# Patient Record
Sex: FEMALE | Race: WHITE | NOT HISPANIC OR LATINO | Employment: OTHER | URBAN - METROPOLITAN AREA
[De-identification: names, ages, dates, MRNs, and addresses within clinical notes are randomized per-mention and may not be internally consistent; named-entity substitution may affect disease eponyms.]

---

## 2018-01-02 ENCOUNTER — OFFICE VISIT (OUTPATIENT)
Dept: FAMILY MEDICINE | Facility: CLINIC | Age: 63
End: 2018-01-02
Payer: COMMERCIAL

## 2018-01-02 VITALS
DIASTOLIC BLOOD PRESSURE: 84 MMHG | HEART RATE: 84 BPM | WEIGHT: 133 LBS | SYSTOLIC BLOOD PRESSURE: 130 MMHG | TEMPERATURE: 98.1 F | RESPIRATION RATE: 16 BRPM | BODY MASS INDEX: 23.57 KG/M2 | OXYGEN SATURATION: 99 % | HEIGHT: 63 IN

## 2018-01-02 DIAGNOSIS — R19.7 DIARRHEA, UNSPECIFIED TYPE: Primary | ICD-10-CM

## 2018-01-02 DIAGNOSIS — R63.4 LOSS OF WEIGHT: ICD-10-CM

## 2018-01-02 PROBLEM — N81.11 MIDLINE CYSTOCELE: Status: ACTIVE | Noted: 2018-01-02

## 2018-01-02 PROBLEM — E78.2 MIXED HYPERLIPIDEMIA: Status: ACTIVE | Noted: 2018-01-02

## 2018-01-02 PROBLEM — M85.80 OSTEOPENIA: Status: ACTIVE | Noted: 2018-01-02

## 2018-01-02 LAB
ALBUMIN SERPL-MCNC: 3.9 G/DL (ref 3.4–5)
ALP SERPL-CCNC: 90 U/L (ref 40–150)
ALT SERPL W P-5'-P-CCNC: 37 U/L (ref 0–50)
ANION GAP SERPL CALCULATED.3IONS-SCNC: 8 MMOL/L (ref 3–14)
AST SERPL W P-5'-P-CCNC: 26 U/L (ref 0–45)
BASOPHILS # BLD AUTO: 0.1 10E9/L (ref 0–0.2)
BASOPHILS NFR BLD AUTO: 0.7 %
BILIRUB SERPL-MCNC: 0.5 MG/DL (ref 0.2–1.3)
BUN SERPL-MCNC: 11 MG/DL (ref 7–30)
CALCIUM SERPL-MCNC: 9.6 MG/DL (ref 8.5–10.1)
CHLORIDE SERPL-SCNC: 104 MMOL/L (ref 94–109)
CO2 SERPL-SCNC: 26 MMOL/L (ref 20–32)
CREAT SERPL-MCNC: 0.82 MG/DL (ref 0.52–1.04)
DIFFERENTIAL METHOD BLD: NORMAL
EOSINOPHIL # BLD AUTO: 0.2 10E9/L (ref 0–0.7)
EOSINOPHIL NFR BLD AUTO: 2.4 %
ERYTHROCYTE [DISTWIDTH] IN BLOOD BY AUTOMATED COUNT: 12.8 % (ref 10–15)
GFR SERPL CREATININE-BSD FRML MDRD: 70 ML/MIN/1.7M2
GLUCOSE SERPL-MCNC: 94 MG/DL (ref 70–99)
HCT VFR BLD AUTO: 39.4 % (ref 35–47)
HGB BLD-MCNC: 13.4 G/DL (ref 11.7–15.7)
LYMPHOCYTES # BLD AUTO: 2.4 10E9/L (ref 0.8–5.3)
LYMPHOCYTES NFR BLD AUTO: 28.4 %
MCH RBC QN AUTO: 30.1 PG (ref 26.5–33)
MCHC RBC AUTO-ENTMCNC: 34 G/DL (ref 31.5–36.5)
MCV RBC AUTO: 89 FL (ref 78–100)
MONOCYTES # BLD AUTO: 0.6 10E9/L (ref 0–1.3)
MONOCYTES NFR BLD AUTO: 7.2 %
NEUTROPHILS # BLD AUTO: 5.2 10E9/L (ref 1.6–8.3)
NEUTROPHILS NFR BLD AUTO: 61.3 %
PLATELET # BLD AUTO: 353 10E9/L (ref 150–450)
POTASSIUM SERPL-SCNC: 3.9 MMOL/L (ref 3.4–5.3)
PROT SERPL-MCNC: 7.9 G/DL (ref 6.8–8.8)
RBC # BLD AUTO: 4.45 10E12/L (ref 3.8–5.2)
SODIUM SERPL-SCNC: 138 MMOL/L (ref 133–144)
TSH SERPL DL<=0.005 MIU/L-ACNC: 1.41 MU/L (ref 0.4–4)
WBC # BLD AUTO: 8.4 10E9/L (ref 4–11)

## 2018-01-02 PROCEDURE — 36415 COLL VENOUS BLD VENIPUNCTURE: CPT | Performed by: NURSE PRACTITIONER

## 2018-01-02 PROCEDURE — 99203 OFFICE O/P NEW LOW 30 MIN: CPT | Performed by: NURSE PRACTITIONER

## 2018-01-02 PROCEDURE — 80050 GENERAL HEALTH PANEL: CPT | Performed by: NURSE PRACTITIONER

## 2018-01-02 RX ORDER — DIPHENOXYLATE HCL/ATROPINE 2.5-.025MG
1-2 TABLET ORAL 4 TIMES DAILY PRN
Qty: 30 TABLET | Refills: 1 | Status: SHIPPED | OUTPATIENT
Start: 2018-01-02 | End: 2018-12-12

## 2018-01-02 RX ORDER — CALCIUM CARBONATE 500(1250)
1 TABLET ORAL 2 TIMES DAILY
COMMUNITY

## 2018-01-02 ASSESSMENT — PAIN SCALES - GENERAL: PAINLEVEL: NO PAIN (0)

## 2018-01-02 NOTE — PATIENT INSTRUCTIONS
[facility]    If you have any questions regarding to your visit please contact your care team:     Team Pink:   Clinic Hours Telephone Number   Internal Medicine:  Dr. Esther Norris NP       7am-7pm  Monday - Thursday   7am-5pm  Fridays  [phone]  (Appointment scheduling available 24/7)    Questions about your visit?  Team Line  [phone]   Urgent Care - [place] and [facility] - 11am-9pm Monday-Friday Saturday-Sunday- 9am-5pm   [place] - 5pm-9pm Monday-Friday Saturday-Sunday- 9am-5pm  [phone] - [place]   [phone] - [place]       What options do I have for visits at the clinic other than the traditional office visit?  To expand how we care for you, many of our providers are utilizing electronic visits (e-visits) and telephone visits, when medically appropriate, for interactions with their patients rather than a visit in the clinic.   We also offer nurse visits for many medical concerns. Just like any other service, we will bill your insurance company for this type of visit based on time spent on the phone with your provider. Not all insurance companies cover these visits. Please check with your medical insurance if this type of visit is covered. You will be responsible for any charges that are not paid by your insurance.      E-visits via Wild Needle:  generally incur a $35.00 fee.  Telephone visits:  Time spent on the phone: *charged based on time that is spent on the phone in increments of 10 minutes. Estimated cost:   5-10 mins $30.00   11-20 mins. $59.00   21-30 mins. $85.00   Use Transinsightt (secure email communication and access to your chart) to send your primary care provider a message or make an appointment. Ask someone on your Team how to sign up for Wild Needle.    For a Price Quote for your services, please call our Consumer Price Line at [phone].    As always, Thank you for trusting us with your health care  needs!    Day RAMIREZ MA

## 2018-01-02 NOTE — PROGRESS NOTES
SUBJECTIVE:   Meg Beverly is a 62 year old female who presents to clinic today for the following health issues:      Diarrhea  Onset: 7-8 weeks    Description:   Consistency of stool: watery  Blood in stool: no   Number of loose stools in past 24 hours: 10    Progression of Symptoms:  constant    Accompanying Signs & Symptoms:  Fever: no   Nausea or vomiting; YES- nausea  Abdominal pain: YES- cramping  Episodes of constipation: no   Weight loss: YES- 4-5 lbs    History:   Ill contacts: no   Recent use of antibiotics: no    Recent travels: no          Recent medication-new or changes(Rx or OTC): no     Precipitating factors:   None    Alleviating factors:   Immodium    Therapies Tried and outcome:  Imodium AD; Outcome: some improvement.    Patient's brother has Crohn's disease.  Patient had colonoscopy last in 2005.        Problem list and histories reviewed & adjusted, as indicated.  Additional history: as documented    Patient Active Problem List   Diagnosis     Osteopenia     Mixed hyperlipidemia     Midline cystocele     Past Surgical History:   Procedure Laterality Date     GYN SURGERY      D & C     ORTHOPEDIC SURGERY Left 09/2014    meniscus repair       Social History   Substance Use Topics     Smoking status: Never Smoker     Smokeless tobacco: Never Used     Alcohol use Yes      Comment: Social     Family History   Problem Relation Age of Onset     OSTEOPOROSIS Mother      Rheumatoid Arthritis Mother      CEREBROVASCULAR DISEASE Father      Hypertension Maternal Grandmother      Emphysema Maternal Grandfather      Coronary Artery Disease Paternal Grandmother      Alzheimer Disease Brother      Crohn Disease Brother          Current Outpatient Prescriptions   Medication Sig Dispense Refill     Multiple Vitamin (MULTI VITAMIN PO)        calcium carbonate (OS-LAURI 500 MG [place]. [place]) 1250 MG tablet Take 1 tablet by mouth 2 times daily       diphenoxylate-atropine (LOMOTIL) 2.5-0.025 MG per tablet Take 1-2  "tablets by mouth 4 times daily as needed for diarrhea 30 tablet 1     Allergies   Allergen Reactions     Latex Rash     BP Readings from Last 3 Encounters:   01/02/18 130/84    Wt Readings from Last 3 Encounters:   01/02/18 133 lb (60.3 kg)                  Labs reviewed in EPIC        Reviewed and updated as needed this visit by clinical staff     Reviewed and updated as needed this visit by Provider         ROS:  Constitutional, HEENT, cardiovascular, pulmonary, gi and gu systems are negative, except as otherwise noted.      OBJECTIVE:   /84  Pulse 84  Temp 98.1  F (36.7  C) (Oral)  Resp 16  Ht 5' 2.75\" (1.594 m)  Wt 133 lb (60.3 kg)  SpO2 99%  BMI 23.75 kg/m2  Body mass index is 23.75 kg/(m^2).  GENERAL: healthy, alert and no distress  RESP: lungs clear to auscultation - no rales, rhonchi or wheezes  CV: regular rate and rhythm, normal S1 S2, no S3 or S4, no murmur, click or rub, no peripheral edema and peripheral pulses strong  ABDOMEN: soft, nontender, no hepatosplenomegaly, no masses and bowel sounds normal  MS: no gross musculoskeletal defects noted, no edema    Diagnostic Test Results:  pending    ASSESSMENT/PLAN:     1. Diarrhea, unspecified type  Patient is due for colonoscopy.  Will place referral for screening vs diagnostic pending results.  - CBC with platelets differential  - Comprehensive metabolic panel  - TSH with free T4 reflex  - Clostridium difficile toxin B PCR; Future  - Enteric Bacteria and Virus Panel by MILIND Stool; Future  - Ova and Parasite Exam Routine; Future  - diphenoxylate-atropine (LOMOTIL) 2.5-0.025 MG per tablet; Take 1-2 tablets by mouth 4 times daily as needed for diarrhea  Dispense: 30 tablet; Refill: 1    2. Loss of weight    - CBC with platelets differential  - Comprehensive metabolic panel  - TSH with free T4 reflex    FUTURE APPOINTMENTS:       - Follow-up visit pending test results.    JACQUELIN Muhammad [facility]3]"

## 2018-01-02 NOTE — LETTER
[address] [place] [address]    January 3, 2018    [place]eg [place]  [address] [place] [address]          Dear Meg,    Normal thyroid.   Normal kidney function and electrolytes.   No anemia.       If you have any questions please feel free to contact [phone] or myself via BeMyEyehart    Enclosed is a copy of your results.     Results for orders placed or performed in visit on 01/02/18   CBC with platelets differential   Result Value Ref Range    WBC 8.4 4.0 - 11.0 10e9/L    RBC Count 4.45 3.8 - 5.2 10e12/L    Hemoglobin 13.4 11.7 - 15.7 g/dL    Hematocrit 39.4 35.0 - 47.0 %    MCV 89 78 - 100 fl    MCH 30.1 26.5 - 33.0 pg    MCHC 34.0 31.5 - 36.5 g/dL    RDW 12.8 10.0 - 15.0 %    Platelet Count 353 150 - 450 10e9/L    Diff Method Automated Method     % Neutrophils 61.3 %    % Lymphocytes 28.4 %    % Monocytes 7.2 %    % Eosinophils 2.4 %    % Basophils 0.7 %    Absolute Neutrophil 5.2 1.6 - 8.3 10e9/L    Absolute Lymphocytes 2.4 0.8 - 5.3 10e9/L    Absolute Monocytes 0.6 0.0 - 1.3 10e9/L    Absolute Eosinophils 0.2 0.0 - 0.7 10e9/L    Absolute Basophils 0.1 0.0 - 0.2 10e9/L   Comprehensive metabolic panel   Result Value Ref Range    Sodium 138 133 - 144 mmol/L    Potassium 3.9 3.4 - 5.3 mmol/L    Chloride 104 94 - 109 mmol/L    Carbon Dioxide 26 20 - 32 mmol/L    Anion Gap 8 3 - 14 mmol/L    Glucose 94 70 - 99 mg/dL    Urea Nitrogen 11 7 - 30 mg/dL    Creatinine 0.82 0.52 - 1.04 mg/dL    GFR Estimate 70 >60 mL/min/1.7m2    GFR Estimate If Black 85 >60 mL/min/1.7m2    Calcium 9.6 8.5 - 10.1 mg/dL    Bilirubin Total 0.5 0.2 - 1.3 mg/dL    Albumin 3.9 3.4 - 5.0 g/dL    Protein Total 7.9 6.8 - 8.8 g/dL    Alkaline Phosphatase 90 40 - 150 U/L    ALT 37 0 - 50 U/L    AST 26 0 - 45 U/L   TSH with free T4 reflex   Result Value Ref Range    TSH 1.41 0.40 - 4.00 mU/L       If you have any questions or concerns,  please call myself or my nurse at [phone].      Sincerely,        Dara Norris CNP/acosta

## 2018-01-02 NOTE — PROGRESS NOTES
Dear Meg,    Your recent test results are attached.      Normal thyroid.  Normal kidney function and electrolytes.  No anemia.      If you have any questions please feel free to contact [phone] or myself via [email or web address].    Sincerely,  Dara Norris, CNP

## 2018-01-02 NOTE — Clinical Note
Please abstract the following data from this visit with this patient into the appropriate field in Epic:  Colonoscopy done on this date: 2005 (approximately), by this group: [facility], results were normal.  Mammogram done on this date: 2016 (approximately), by this group: [facility], results were normal.  Pap smear done on this date: 2015 (approximately), by this group: [facility], results were normal .

## 2018-01-08 DIAGNOSIS — R19.7 DIARRHEA, UNSPECIFIED TYPE: ICD-10-CM

## 2018-01-08 PROCEDURE — 87209 SMEAR COMPLEX STAIN: CPT | Performed by: NURSE PRACTITIONER

## 2018-01-08 PROCEDURE — 87177 OVA AND PARASITES SMEARS: CPT | Performed by: NURSE PRACTITIONER

## 2018-01-08 PROCEDURE — 87506 IADNA-DNA/RNA PROBE TQ 6-11: CPT | Performed by: NURSE PRACTITIONER

## 2018-01-09 LAB
O+P STL MICRO: NORMAL
O+P STL MICRO: NORMAL
SPECIMEN SOURCE: NORMAL

## 2018-01-10 DIAGNOSIS — R19.7 DIARRHEA: Primary | ICD-10-CM

## 2018-01-10 DIAGNOSIS — R63.4 WEIGHT LOSS: ICD-10-CM

## 2018-01-11 ENCOUNTER — TELEPHONE (OUTPATIENT)
Dept: INTERNAL MEDICINE | Facility: CLINIC | Age: 63
End: 2018-01-11

## 2018-01-11 NOTE — TELEPHONE ENCOUNTER
Reason for Call:  Other call back    Detailed comments: Pt is scheduled for a colonoscopy 1-17-18. Pt would like to know if she can continue to take her diphenoxylate-atropine. Please call pt to advise.    Phone Number Patient can be reached at: Home number on file [phone] (home)    Best Time: any    Can we leave a detailed message on this number? YES    Call taken on 1/11/2018 at 11:51 AM by Ebonie Jules

## 2018-01-12 NOTE — TELEPHONE ENCOUNTER
Spoke with patient.   Provider note read as written.  No further questions at this time.     Chen Lawson RN

## 2018-01-17 ENCOUNTER — SURGERY (OUTPATIENT)
Age: 63
End: 2018-01-17

## 2018-01-17 ENCOUNTER — HOSPITAL ENCOUNTER (OUTPATIENT)
Facility: AMBULATORY SURGERY CENTER | Age: 63
Discharge: HOME | End: 2018-01-17
Attending: SURGERY | Admitting: SURGERY
Payer: COMMERCIAL

## 2018-01-17 VITALS
SYSTOLIC BLOOD PRESSURE: 126 MMHG | TEMPERATURE: 97.6 F | DIASTOLIC BLOOD PRESSURE: 82 MMHG | RESPIRATION RATE: 16 BRPM | OXYGEN SATURATION: 98 %

## 2018-01-17 LAB — COLONOSCOPY: NORMAL

## 2018-01-17 PROCEDURE — 45385 COLONOSCOPY W/LESION REMOVAL: CPT | Mod: PT | Performed by: SURGERY

## 2018-01-17 PROCEDURE — G8907 PT DOC NO EVENTS ON DISCHARG: HCPCS

## 2018-01-17 PROCEDURE — 45381 COLONOSCOPY SUBMUCOUS NJX: CPT

## 2018-01-17 PROCEDURE — 45381 COLONOSCOPY SUBMUCOUS NJX: CPT | Mod: PT | Performed by: SURGERY

## 2018-01-17 PROCEDURE — G8918 PT W/O PREOP ORDER IV AB PRO: HCPCS

## 2018-01-17 PROCEDURE — 45380 COLONOSCOPY AND BIOPSY: CPT | Mod: XS

## 2018-01-17 PROCEDURE — 88305 TISSUE EXAM BY PATHOLOGIST: CPT | Performed by: PATHOLOGY

## 2018-01-17 PROCEDURE — 45385 COLONOSCOPY W/LESION REMOVAL: CPT

## 2018-01-17 PROCEDURE — 99152 MOD SED SAME PHYS/QHP 5/>YRS: CPT | Mod: 59 | Performed by: SURGERY

## 2018-01-17 PROCEDURE — 45380 COLONOSCOPY AND BIOPSY: CPT | Mod: 59 | Performed by: SURGERY

## 2018-01-17 RX ORDER — LIDOCAINE 40 MG/G
CREAM TOPICAL
Status: DISCONTINUED | OUTPATIENT
Start: 2018-01-17 | End: 2018-01-18 | Stop reason: HOSPADM

## 2018-01-17 RX ORDER — ONDANSETRON 2 MG/ML
4 INJECTION INTRAMUSCULAR; INTRAVENOUS
Status: DISCONTINUED | OUTPATIENT
Start: 2018-01-17 | End: 2018-01-18 | Stop reason: HOSPADM

## 2018-01-17 RX ORDER — FENTANYL CITRATE 50 UG/ML
INJECTION, SOLUTION INTRAMUSCULAR; INTRAVENOUS PRN
Status: DISCONTINUED | OUTPATIENT
Start: 2018-01-17 | End: 2018-01-17 | Stop reason: HOSPADM

## 2018-01-17 RX ADMIN — FENTANYL CITRATE 50 MCG: 50 INJECTION, SOLUTION INTRAMUSCULAR; INTRAVENOUS at 11:20

## 2018-01-17 RX ADMIN — FENTANYL CITRATE 100 MCG: 50 INJECTION, SOLUTION INTRAMUSCULAR; INTRAVENOUS at 11:07

## 2018-01-17 RX ADMIN — FENTANYL CITRATE 50 MCG: 50 INJECTION, SOLUTION INTRAMUSCULAR; INTRAVENOUS at 11:22

## 2018-01-19 LAB — COPATH REPORT: NORMAL

## 2018-01-21 ENCOUNTER — HEALTH MAINTENANCE LETTER (OUTPATIENT)
Age: 63
End: 2018-01-21

## 2018-01-24 ENCOUNTER — TELEPHONE (OUTPATIENT)
Dept: FAMILY MEDICINE | Facility: CLINIC | Age: 63
End: 2018-01-24

## 2018-01-24 NOTE — TELEPHONE ENCOUNTER
Patient called [facility] requesting results from Colonoscopy. Patient read on her forms that if she wasn't given a call from Provider office regarding results in 1 week to call and request for results. Patient informed writer that it is okay to leave detailed message in case if she doesn't answer. Please advise. Rita Rees MA

## 2018-02-01 DIAGNOSIS — K58.9 IRRITABLE BOWEL SYNDROME: ICD-10-CM

## 2018-02-01 DIAGNOSIS — R19.7 DIARRHEA, UNSPECIFIED TYPE: ICD-10-CM

## 2018-02-01 DIAGNOSIS — K57.90 DIVERTICULOSIS: Primary | ICD-10-CM

## 2018-02-01 LAB
TTG IGA SER-ACNC: <1 U/ML
TTG IGG SER-ACNC: <1 U/ML

## 2018-02-01 PROCEDURE — 99000 SPECIMEN HANDLING OFFICE-LAB: CPT | Performed by: NURSE PRACTITIONER

## 2018-02-01 PROCEDURE — 83516 IMMUNOASSAY NONANTIBODY: CPT | Performed by: NURSE PRACTITIONER

## 2018-02-01 PROCEDURE — 83516 IMMUNOASSAY NONANTIBODY: CPT | Mod: 59 | Performed by: NURSE PRACTITIONER

## 2018-02-01 PROCEDURE — 86256 FLUORESCENT ANTIBODY TITER: CPT | Mod: 90 | Performed by: NURSE PRACTITIONER

## 2018-02-01 PROCEDURE — 36415 COLL VENOUS BLD VENIPUNCTURE: CPT | Performed by: NURSE PRACTITIONER

## 2018-02-02 LAB — ENDOMYSIUM IGA TITR SER IF: NORMAL {TITER}

## 2018-02-02 NOTE — PROGRESS NOTES
Dear Meg,    Your recent test results are attached.      Negative celiac test.  Further test results are still pending.    If you have any questions please feel free to contact [phone] or myself via Camping and Cot.    Sincerely,  Dara Norris, CNP

## 2018-03-21 ENCOUNTER — OFFICE VISIT (OUTPATIENT)
Dept: NUTRITION | Facility: CLINIC | Age: 63
End: 2018-03-21
Payer: COMMERCIAL

## 2018-03-21 VITALS — WEIGHT: 135 LBS | BODY MASS INDEX: 24.11 KG/M2

## 2018-03-21 DIAGNOSIS — K58.0 IRRITABLE BOWEL SYNDROME WITH DIARRHEA: Primary | ICD-10-CM

## 2018-03-21 PROCEDURE — 97802 MEDICAL NUTRITION INDIV IN: CPT

## 2018-03-21 NOTE — Clinical Note
We were able to identify many foods that may be contributing to the diarrhea and have a starting point.  Hopefully this will resolve the issue!  Tara Hong MS, RD, LD, CDE

## 2018-03-21 NOTE — PROGRESS NOTES
MEDICAL NUTRITION THERAPY  Visit Type:Initial assessment and intervention    SUBJECTIVE:   Meg Beverly presents today for MNT and education related to Diarrhea.     She is accompanied by self.     PATIENT STATED GOAL(S) FOR THIS VISIT: Would like to determine what might be causing diarrhea.  This past week had usual diarrhea, and reports that it is random and explosive diarrhea. Yesterday was last episode.  Typically it is 2-3 times per week.  Has noticed that she is sensitive to some fruits and vegetables.      EATING HABITS: Fresh fruits and vegetables know to cause diarrhea (Apples and apple sauce)    Has humus in fridge, a few times per week           Beverages: Alcohol 0-1/day, Tea  0-1/day, Coffee 1-2/day and Water throughout/day- Patient Tea includes Chi and Herbal teas    Misses meals? no    Previous diet education:  No     Food allergies/intolerances: Denies Lactose intolerance    EXERCISE: not assessed    SOCIO/ECONOMIC:   Lives with: self and spouse    OBJECTIVE:   Vitals: Wt 61.2 kg (135 lb)  BMI 24.11 kg/m2     Wt Readings from Last 1 Encounters:   01/02/18 60.3 kg (133 lb)       Weight Change: stable for past 2 months in the presence of intermittent diarrhea    ASSESSMENT:   Patient would benefit from trying Low-FODMAP diet pattern as diet history and food recall include many FODMAP foods.  Patient may have a total daily tolerance of all FODMAP foods or some specific FODMAP foods may cause diarrhea.  One tea patient reported ingredients contain Chicory know to have FODMAP and diarrhea causing properties.  After further discussion of diarrhea bout in November patient reports trying to switch from coffee to tea at that time and was adding honey to her cup which contains Fructose.  Patient reports she felt she should wean herself off the honey due to sugar content, and notes it's possible that contributed to decreased episodes of diarrhea.  FODMAP foods that patient frequently eats were identified:  Chi tea, many vegetables, onion, wheat breads and crackers.  Non FODMAP foods that may contribute to diarrhea include coffee and alcohol.  Patients yogurt and kefir do not contain FODMAP foods.    Diet is high in: fiber, fruits and vegetables  Diet is low in: fat (saturated), fat (unsaturated) and fruits    Estimated Energy Expenditure: 1222 kcal  Recommended Energy Intake: 1222 kcal for weight maintinencce    VERBAL AND WRITTEN INFORMATION GIVEN TO SUPPORT:  Discussed: Used FODMAP food lists and handouts to discuss FODMAP foods allowed and Not allowed foods.  Explained that the initial recommendation is to stick to the Low-FODMAP diet plan with none of the not-allowed foods to create a foundation of usual food consumption that is thought to limit diarrhea.  If after 2-6 weeks episodes of diarrhea are eliminated try reintroducing 1-2 foods per week or 1 new food each day depending on tolerance and track symptoms.  Discussed specifically non-wheat alternative grains and how to cook them as patients diet recall contained may wheat products.  Reviewed how to read a nutrition label to identify common FODMAP foods.    Education Materials Provided: Low-FODMAP Nutrition Therapy, Low-FODMAP food list, food symptom diary sheet    PLAN:   Follow FODMAP elimination diet plan-as outlined in handout  Initial phase- Low-FODMAP only foods for 2-6 weeks which eliminates all High-FODMAP foods from daily intake  Test phase- Reintroduce 1-2 new foods per week OR 1 new food each day depending on symptoms  Keep a symptom and food diary.    PATIENT'S BEHAVIOR CHANGE GOALS:   See Patient Instructions for patient stated behavior change goals. AVS was printed and given to patient at today's appointment.    FOLLOW UP:   Follow up with RD as needed.  Recommended 6-8 weeks after initial trial phase.    Tara Hong MS, RD, LD, CDE    Time spent in minutes: 75  Encounter: Individual

## 2018-03-21 NOTE — MR AVS SNAPSHOT
After Visit Summary   3/21/2018    Meg Beverly    MRN: [id number]           Patient Information     Date Of Birth          1955        Visit Information        Provider Department      3/21/2018 3:30 PM  NUTRITION RESOURCE [facility]        Care Instructions    Lao coconut chi-  Has Chicory which triggers diarrhea    Polenta- easy corn based side, can be sliced and fried or mixed up into a risotto/grits consistency    [email or web address]    [email or web address]    Follow up in 6-8 weeks if trying Low-FODMAP foods/diet pattern has not resolved the diarrhea    [facility]:   Nutrition Appointments Call:  [phone]    Nutrition Related Questions:   Phone: [phone]  E-mail: [email or web address]  Fax: [phone]                   Follow-ups after your visit        Who to contact     If you have questions or need follow up information about today's clinic visit or your schedule please contact [facility] directly at [phone].  Normal or non-critical lab and imaging results will be communicated to you by ScoopStakehart, letter or phone within 4 business days after the clinic has received the results. If you do not hear from us within 7 days, please contact the clinic through ScoopStakehart or phone. If you have a critical or abnormal lab result, we will notify you by phone as soon as possible.  Submit refill requests through [employer] or call your pharmacy and they will forward the refill request to us. Please allow 3 business days for your refill to be completed.          Additional Information About Your Visit        MyChart Information     [employer] gives you secure access to your electronic health record. If you see a primary care provider, you can also send messages to your care team and make appointments. If you have questions, please call your primary care clinic.  If you do not have a primary care  provider, please call [phone] and they will assist you.        Care EveryWhere ID     This is your Care EveryWhere ID. This could be used by other organizations to access your [facility] medical records  [id number]        Your Vitals Were     BMI (Body Mass Index)                   24.11 kg/m2            Blood Pressure from Last 3 Encounters:   01/17/18 126/82   01/02/18 130/84    Weight from Last 3 Encounters:   03/21/18 61.2 kg (135 lb)   01/02/18 60.3 kg (133 lb)              Today, you had the following     No orders found for display       Primary Care Provider Office Phone # Fax #    [facility] [phone] [address] [address]        Equal Access to Services     KIARA BADILLO : Hadii aad ku hadasho Soomaali, waaxda luqadaha, qaybta kaalmada adeegyada, alexandro mccloud . So [facility] [phone].    ATENCIÓN: Si habla español, tiene a barajas disposición servicios gratuitos de asistencia lingüística. Llame al [phone].    We comply with applicable federal civil rights laws and [place] laws. We do not discriminate on the basis of race, color, national origin, age, disability, sex, sexual orientation, or gender identity.            Thank you!     Thank you for choosing [facility]  for your care. Our goal is always to provide you with excellent care. Hearing back from our patients is one way we can continue to improve our services. Please take a few minutes to complete the written survey that you may receive in the mail after your visit with us. Thank you!             Your Updated Medication List - Protect others around you: Learn how to safely use, store and throw away your medicines at www.disposemymeds.org.          This list is accurate as of 3/21/18  4:26 PM.  Always use your most recent med list.                   Brand Name Dispense Instructions for use Diagnosis    calcium carbonate 1250 MG tablet    OS-LAURI 500 mg  [place]. [place]     Take 1 tablet by mouth 2 times daily        diphenoxylate-atropine 2.5-0.025 MG per tablet    LOMOTIL    30 tablet    Take 1-2 tablets by mouth 4 times daily as needed for diarrhea    Diarrhea, unspecified type       MULTI VITAMIN PO

## 2018-06-06 ENCOUNTER — OFFICE VISIT (OUTPATIENT)
Dept: FAMILY MEDICINE | Facility: CLINIC | Age: 63
End: 2018-06-06
Payer: COMMERCIAL

## 2018-06-06 VITALS
DIASTOLIC BLOOD PRESSURE: 64 MMHG | SYSTOLIC BLOOD PRESSURE: 128 MMHG | TEMPERATURE: 97.5 F | OXYGEN SATURATION: 98 % | HEIGHT: 63 IN | BODY MASS INDEX: 22.39 KG/M2 | HEART RATE: 87 BPM | RESPIRATION RATE: 14 BRPM | WEIGHT: 126.4 LBS

## 2018-06-06 DIAGNOSIS — L98.9 SKIN LESION: Primary | ICD-10-CM

## 2018-06-06 DIAGNOSIS — K59.1 FUNCTIONAL DIARRHEA: ICD-10-CM

## 2018-06-06 PROBLEM — K58.0 IRRITABLE BOWEL SYNDROME WITH DIARRHEA: Status: ACTIVE | Noted: 2018-06-06

## 2018-06-06 PROBLEM — K58.0 IRRITABLE BOWEL SYNDROME WITH DIARRHEA: Status: RESOLVED | Noted: 2018-06-06 | Resolved: 2018-06-06

## 2018-06-06 PROCEDURE — 99213 OFFICE O/P EST LOW 20 MIN: CPT | Performed by: NURSE PRACTITIONER

## 2018-06-06 ASSESSMENT — PAIN SCALES - GENERAL: PAINLEVEL: NO PAIN (0)

## 2018-06-06 NOTE — PATIENT INSTRUCTIONS
[facility]    If you have any questions regarding to your visit please contact your care team:     Team Pink:   Clinic Hours Telephone Number   Internal Medicine:  Dr. Esther Norris NP       7am-7pm  Monday - Thursday   7am-5pm  Fridays  [phone]  (Appointment scheduling available 24/7)    Questions about your recent visit?  Team Line  [phone]   Urgent Care - [place] - 11am-9pm Monday-Friday Saturday-Sunday- 9am-5pm   [place] - 5pm-9pm Monday-Friday Saturday-Sunday- 9am-5pm  [phone] - [place]  [phone] - [place]       What options do I have for a visit other than an office visit? We offer electronic visits (e-visits) and telephone visits, when medically appropriate.  Please check with your medical insurance to see if these types of visits are covered, as you will be responsible for any charges that are not paid by your insurance.      You can use [employer] (secure electronic communication) to access to your chart, send your primary care provider a message, or make an appointment. Ask a team member how to get started.     For a price quote for your services, please call our Consumer Price Line at [phone] or our Imaging Cost estimation line at [phone] (for imaging tests).  Sirisha Pan CMA

## 2018-06-06 NOTE — MR AVS SNAPSHOT
After Visit Summary   6/6/2018    Meg Beverly    MRN: [id number]           Patient Information     Date Of Birth          1955        Visit Information        Provider Department      6/6/2018 3:20 PM Dara Norris APRN [facility]        Today's Diagnoses     Skin lesion    -  1    Functional diarrhea          Care Instructions    [facility]    If you have any questions regarding to your visit please contact your care team:     Team Pink:   Clinic Hours Telephone Number   Internal Medicine:  Dr. Esther Norris, NP       7am-7pm  Monday - Thursday   7am-5pm  Fridays  [phone]  (Appointment scheduling available 24/7)    Questions about your recent visit?  Team Line  [phone]   Urgent Care - [place] and [facility] - 11am-9pm Monday-Friday Saturday-Sunday- 9am-5pm   [place] - 5pm-9pm Monday-Friday Saturday-Sunday- 9am-5pm  [phone] - [place]  [phone] - [place]       What options do I have for a visit other than an office visit? We offer electronic visits (e-visits) and telephone visits, when medically appropriate.  Please check with your medical insurance to see if these types of visits are covered, as you will be responsible for any charges that are not paid by your insurance.      You can use [employer] (secure electronic communication) to access to your chart, send your primary care provider a message, or make an appointment. Ask a team member how to get started.     For a price quote for your services, please call our Consumer Price Line at [phone] or our Imaging Cost estimation line at [phone] (for imaging tests).  Sirisha Pan CMA             Follow-ups after your visit        Additional Services     DERMATOLOGY REFERRAL       Your provider has referred you to: [facility]: [facility] [phone]    [email or web address]  [facility] [place] (2 locations) [phone]   [email or web address]    Please be aware that coverage of these services is subject to the terms and limitations of your health insurance plan.  Call member services at your health plan with any benefit or coverage questions.      Please bring the following with you to your appointment:    (1) Any X-Rays, CTs or MRIs which have been performed.  Contact the facility where they were done to arrange for  prior to your scheduled appointment.    (2) List of current medications  (3) This referral request   (4) Any documents/labs given to you for this referral                  Who to contact     If you have questions or need follow up information about today's clinic visit or your schedule please contact [facility] directly at [phone].  Normal or non-critical lab and imaging results will be communicated to you by MyChart, letter or phone within 4 business days after the clinic has received the results. If you do not hear from us within 7 days, please contact the clinic through People[employer] or phone. If you have a critical or abnormal lab result, we will notify you by phone as soon as possible.  Submit refill requests through [employer] or call your pharmacy and they will forward the refill request to us. Please allow 3 business days for your refill to be completed.          Additional Information About Your Visit        [employer] Information     [employer] gives you secure access to your electronic health record. If you see a primary care provider, you can also send messages to your care team and make appointments. If you have questions, please call your primary care clinic.  If you do not have a primary care provider, please call [phone] and they will assist you.        Care EveryWhere ID     This is your Care EveryWhere ID. This could be used by other  "organizations to access your [facility] medical records  [id number]        Your Vitals Were     Pulse Temperature Respirations Height Pulse Oximetry BMI (Body Mass Index)    87 97.5  F (36.4  C) (Oral) 14 5' 3.31\" (1.608 m) 98% 22.17 kg/m2       Blood Pressure from Last 3 Encounters:   06/06/18 128/64   01/17/18 126/82   01/02/18 130/84    Weight from Last 3 Encounters:   06/06/18 126 lb 6.4 oz (57.3 kg)   03/21/18 135 lb (61.2 kg)   01/02/18 133 lb (60.3 kg)              We Performed the Following     DERMATOLOGY REFERRAL        Primary Care Provider Office Phone # Fax #    [facility] [phone] [address] [facility] [address]        Equal Access to Services     [facility]IZ [facility] : Hadii celestine seay hadasho Somaricruz, waaxda luqadaha, qaybta kaalmada adeegyada, alexandro perezin hayjohn mccloud . So [facility] [phone].    ATENCIÓN: Si habla español, tiene a barajas disposición servicios gratuitos de asistencia lingüística. Jessica al [phone].    We comply with applicable federal civil rights laws and [place] laws. We do not discriminate on the basis of race, color, national origin, age, disability, sex, sexual orientation, or gender identity.            Thank you!     Thank you for choosing [facility]  for your care. Our goal is always to provide you with excellent care. Hearing back from our patients is one way we can continue to improve our services. Please take a few minutes to complete the written survey that you may receive in the mail after your visit with us. Thank you!             Your Updated Medication List - Protect others around you: Learn how to safely use, store and throw away your medicines at www.disposemymeds.org.          This list is accurate as of 6/6/18  4:01 PM.  Always use your most recent med list.                   Brand Name Dispense Instructions for use Diagnosis    calcium carbonate 500 MG tablet    OS-LAURI 500 mg [place]. [place]     Take 1 " tablet by mouth 2 times daily        diphenoxylate-atropine 2.5-0.025 MG per tablet    LOMOTIL    30 tablet    Take 1-2 tablets by mouth 4 times daily as needed for diarrhea    Diarrhea, unspecified type       MULTI VITAMIN PO

## 2018-06-06 NOTE — PROGRESS NOTES
SUBJECTIVE:   Meg Beverly is a 62 year old female who presents to clinic today for the following health issues:      Chief Complaint   Patient presents with     Derm Problem     red spot on rt ankle, getting bigger and raised, itchy sometimes- noticed 5 months ago     Patient notes a skin lesion to her right shin.  It has grown and she is concerned it may be skin cancer.  She denies bleeding or drainage.    Patient notes that her functional diarrhea has fully resolved with following the FODMAP diet.  She has lost weight, but feel the diet has been completely beneficial.      Problem list and histories reviewed & adjusted, as indicated.  Additional history: as documented    Patient Active Problem List   Diagnosis     Osteopenia     Mixed hyperlipidemia     Midline cystocele     Functional diarrhea     Past Surgical History:   Procedure Laterality Date     COLONOSCOPY N/A 1/17/2018    Procedure: COMBINED COLONOSCOPY, SINGLE OR MULTIPLE BIOPSY/POLYPECTOMY BY BIOPSY;;  Surgeon: Curly Laura DO;  Location: [facility] OR     COLONOSCOPY WITH CO2 INSUFFLATION N/A 1/17/2018    Procedure: COLONOSCOPY WITH CO2 INSUFFLATION;  COLON-DIARRHEA,WEIGHT LOSS/ VANDER WAL;  Surgeon: Curly Laura DO;  Location: [facility] OR     GYN SURGERY      D & C     ORTHOPEDIC SURGERY Left 09/2014    meniscus repair       Social History   Substance Use Topics     Smoking status: Never Smoker     Smokeless tobacco: Never Used     Alcohol use Yes      Comment: Social     Family History   Problem Relation Age of Onset     OSTEOPOROSIS Mother      Rheumatoid Arthritis Mother      CEREBROVASCULAR DISEASE Father      Hypertension Maternal Grandmother      Emphysema Maternal Grandfather      Coronary Artery Disease Paternal Grandmother      Alzheimer Disease Brother      Crohn Disease Brother          Current Outpatient Prescriptions   Medication Sig Dispense Refill     calcium carbonate (OS-LAURI 500 MG [place]. [place]) 1250 MG tablet Take 1 tablet by mouth 2  "times daily       Multiple Vitamin (MULTI VITAMIN PO)        diphenoxylate-atropine (LOMOTIL) 2.5-0.025 MG per tablet Take 1-2 tablets by mouth 4 times daily as needed for diarrhea (Patient not taking: Reported on 6/6/2018) 30 tablet 1     Allergies   Allergen Reactions     Latex Rash     BP Readings from Last 3 Encounters:   06/06/18 128/64   01/17/18 126/82   01/02/18 130/84    Wt Readings from Last 3 Encounters:   06/06/18 126 lb 6.4 oz (57.3 kg)   03/21/18 135 lb (61.2 kg)   01/02/18 133 lb (60.3 kg)                  Labs reviewed in EPIC    Reviewed and updated as needed this visit by clinical staff       Reviewed and updated as needed this visit by Provider         ROS:  Constitutional, HEENT, cardiovascular, pulmonary, gi and gu systems are negative, except as otherwise noted.    OBJECTIVE:     /64  Pulse 87  Temp 97.5  F (36.4  C) (Oral)  Resp 14  Ht 5' 3.31\" (1.608 m)  Wt 126 lb 6.4 oz (57.3 kg)  SpO2 98%  BMI 22.17 kg/m2  Body mass index is 22.17 kg/(m^2).  GENERAL: healthy, alert and no distress  MS: no gross musculoskeletal defects noted, no edema  SKIN: erythematous shiny papular grouped lesion to left shin    Diagnostic Test Results:  none     ASSESSMENT/PLAN:     1. Skin lesion  I am concerned this may be skin cancer.  Patient sent to dermatology for eval.  - DERMATOLOGY REFERRAL    2. Functional diarrhea  Improved with FODMAP diet.      FUTURE APPOINTMENTS:       - Follow-up for annual visit or as needed    JACQUELIN Muhammad CNP  [facility]    "

## 2018-06-20 ENCOUNTER — OFFICE VISIT (OUTPATIENT)
Dept: FAMILY MEDICINE | Facility: CLINIC | Age: 63
End: 2018-06-20
Payer: COMMERCIAL

## 2018-06-20 VITALS
HEIGHT: 63 IN | BODY MASS INDEX: 22.29 KG/M2 | RESPIRATION RATE: 16 BRPM | HEART RATE: 68 BPM | SYSTOLIC BLOOD PRESSURE: 102 MMHG | TEMPERATURE: 97.3 F | OXYGEN SATURATION: 99 % | DIASTOLIC BLOOD PRESSURE: 76 MMHG | WEIGHT: 125.8 LBS

## 2018-06-20 DIAGNOSIS — Z78.0 ASYMPTOMATIC POSTMENOPAUSAL STATUS: ICD-10-CM

## 2018-06-20 DIAGNOSIS — Z11.4 SCREENING FOR HIV (HUMAN IMMUNODEFICIENCY VIRUS): ICD-10-CM

## 2018-06-20 DIAGNOSIS — Z00.00 ROUTINE GENERAL MEDICAL EXAMINATION AT A HEALTH CARE FACILITY: Primary | ICD-10-CM

## 2018-06-20 DIAGNOSIS — Z12.31 ENCOUNTER FOR SCREENING MAMMOGRAM FOR BREAST CANCER: ICD-10-CM

## 2018-06-20 DIAGNOSIS — E78.2 MIXED HYPERLIPIDEMIA: ICD-10-CM

## 2018-06-20 DIAGNOSIS — Z11.59 NEED FOR HEPATITIS C SCREENING TEST: ICD-10-CM

## 2018-06-20 DIAGNOSIS — Z12.4 CERVICAL CANCER SCREENING: ICD-10-CM

## 2018-06-20 LAB
CHOLEST SERPL-MCNC: 250 MG/DL
GLUCOSE SERPL-MCNC: 94 MG/DL (ref 70–99)
HCV AB SERPL QL IA: NONREACTIVE
HDLC SERPL-MCNC: 70 MG/DL
HIV 1+2 AB+HIV1 P24 AG SERPL QL IA: NONREACTIVE
LDLC SERPL CALC-MCNC: 155 MG/DL
NONHDLC SERPL-MCNC: 180 MG/DL
TRIGL SERPL-MCNC: 125 MG/DL

## 2018-06-20 PROCEDURE — 82947 ASSAY GLUCOSE BLOOD QUANT: CPT | Performed by: NURSE PRACTITIONER

## 2018-06-20 PROCEDURE — 87389 HIV-1 AG W/HIV-1&-2 AB AG IA: CPT | Performed by: NURSE PRACTITIONER

## 2018-06-20 PROCEDURE — G0145 SCR C/V CYTO,THINLAYER,RESCR: HCPCS | Performed by: NURSE PRACTITIONER

## 2018-06-20 PROCEDURE — 80061 LIPID PANEL: CPT | Performed by: NURSE PRACTITIONER

## 2018-06-20 PROCEDURE — 86803 HEPATITIS C AB TEST: CPT | Performed by: NURSE PRACTITIONER

## 2018-06-20 PROCEDURE — 87624 HPV HI-RISK TYP POOLED RSLT: CPT | Performed by: NURSE PRACTITIONER

## 2018-06-20 PROCEDURE — 99396 PREV VISIT EST AGE 40-64: CPT | Performed by: NURSE PRACTITIONER

## 2018-06-20 PROCEDURE — 36415 COLL VENOUS BLD VENIPUNCTURE: CPT | Performed by: NURSE PRACTITIONER

## 2018-06-20 ASSESSMENT — PAIN SCALES - GENERAL: PAINLEVEL: NO PAIN (0)

## 2018-06-20 NOTE — PATIENT INSTRUCTIONS
Check with your insurance to see if Shingrix is covered and if it is covered best at the pharmacy or at the clinic.      [facility]    If you have any questions regarding to your visit please contact your care team:     Team Pink:   Clinic Hours Telephone Number   Internal Medicine:  Dr. Esther Norris, NP       7am-7pm  Monday - Thursday   7am-5pm  Fridays  [phone]  (Appointment scheduling available 24/7)    Questions about your recent visit?  Team Line  [phone]   Urgent Care - [place] and [facility] - 11am-9pm Monday-Friday Saturday-Sunday- 9am-5pm   [place] - 5pm-9pm Monday-Friday Saturday-Sunday- 9am-5pm  [phone] - [place]  [phone] - [place]       What options do I have for a visit other than an office visit? We offer electronic visits (e-visits) and telephone visits, when medically appropriate.  Please check with your medical insurance to see if these types of visits are covered, as you will be responsible for any charges that are not paid by your insurance.      You can use [employer] (secure electronic communication) to access to your chart, send your primary care provider a message, or make an appointment. Ask a team member how to get started.     For a price quote for your services, please call our Consumer Price Line at [phone] or our Imaging Cost estimation line at [phone] (for imaging tests).  Sirisha Pan CMA

## 2018-06-20 NOTE — MR AVS SNAPSHOT
After Visit Summary   6/20/2018    Meg Beverly    MRN: [id number]           Patient Information     Date Of Birth          1955        Visit Information        Provider Department      6/20/2018 9:40 AM Dara Norris APRN [facility]        Today's Diagnoses     Routine general medical examination at a health care facility    -  1    Need for hepatitis C screening test        Screening for HIV (human immunodeficiency virus)        Cervical cancer screening        Mixed hyperlipidemia        Asymptomatic postmenopausal status        Encounter for screening mammogram for breast cancer          Care Instructions    Check with your insurance to see if Shingrix is covered and if it is covered best at the pharmacy or at the clinic.      [facility]    If you have any questions regarding to your visit please contact your care team:     Team Pink:   Clinic Hours Telephone Number   Internal Medicine:  Dr. Esther Norris, NP       7am-7pm  Monday - Thursday   7am-5pm  Fridays  [phone]  (Appointment scheduling available 24/7)    Questions about your recent visit?  Team Line  [phone]   Urgent Care - [place] and [place] - 11am-9pm Monday-Friday Saturday-Sunday- 9am-5pm   [place] - 5pm-9pm Monday-Friday Saturday-Sunday- 9am-5pm  [phone] - [place]  [phone] - [place]       What options do I have for a visit other than an office visit? We offer electronic visits (e-visits) and telephone visits, when medically appropriate.  Please check with your medical insurance to see if these types of visits are covered, as you will be responsible for any charges that are not paid by your insurance.      You can use [employer] (secure electronic communication) to access to your chart, send your primary care provider a message, or make an appointment. Ask a team member how to get started.     For a  "price quote for your services, please call our Consumer Price Line at [phone] or our Imaging Cost estimation line at [phone] (for imaging tests).  Sirisha Pan CMA             Follow-ups after your visit        Future tests that were ordered for you today     Open Future Orders        Priority Expected Expires Ordered    *MA Screening Digital Bilateral Routine  6/20/2019 6/20/2018    DX Hip/Pelvis/Spine Routine  6/20/2019 6/20/2018            Who to contact     If you have questions or need follow up information about today's clinic visit or your schedule please contact [facility] directly at [phone].  Normal or non-critical lab and imaging results will be communicated to you by [employer], letter or phone within 4 business days after the clinic has received the results. If you do not hear from us within 7 days, please contact the clinic through [employer] or phone. If you have a critical or abnormal lab result, we will notify you by phone as soon as possible.  Submit refill requests through [employer] or call your pharmacy and they will forward the refill request to us. Please allow 3 business days for your refill to be completed.          Additional Information About Your Visit        MyChart Information     [employer] gives you secure access to your electronic health record. If you see a primary care provider, you can also send messages to your care team and make appointments. If you have questions, please call your primary care clinic.  If you do not have a primary care provider, please call [phone] and they will assist you.        Care EveryWhere ID     This is your Care EveryWhere ID. This could be used by other organizations to access your [facility] medical records  [id number]        Your Vitals Were     Pulse Temperature Respirations Height Pulse Oximetry BMI (Body Mass Index)    68 97.3  F (36.3  C) (Oral) 16 5' 3.3\" (1.608 m) 99% 22.07 kg/m2       Blood Pressure from Last 3 " Encounters:   06/20/18 102/76   06/06/18 128/64   01/17/18 126/82    Weight from Last 3 Encounters:   06/20/18 125 lb 12.8 oz (57.1 kg)   06/06/18 126 lb 6.4 oz (57.3 kg)   03/21/18 135 lb (61.2 kg)              We Performed the Following     Glucose     Hepatitis C Screen Reflex to HCV RNA Quant and Genotype     HIV Screening     HPV High Risk Types DNA Cervical     Lipid panel reflex to direct LDL Fasting     Pap imaged thin layer screen with HPV - recommended age 30 - 65 years (select HPV order below)        Primary Care Provider Office Phone # Fax #    [facility] [phone]       [address] [facility] [address]        Equal Access to Services     KIARA BADILLO : Bo trinho Somaricruz, waaxda luqadaha, qaybta kaalmada adeegyalewis, alexandro baker. So [facility] [phone].    ATENCIÓN: Si habla español, tiene a barajas disposición servicios gratuitos de asistencia lingüística. Llame al [phone].    We comply with applicable federal civil rights laws and [place] laws. We do not discriminate on the basis of race, color, national origin, age, disability, sex, sexual orientation, or gender identity.            Thank you!     [facility]. Our goal is always to provide you with excellent care. Hearing back from our patients is one way we can continue to improve our services. Please take a few minutes to complete the written survey that you may receive in the mail after your visit with us. Thank you!             Your Updated Medication List - Protect others around you: Learn how to safely use, store and throw away your medicines at www.disposemymeds.org.          This list is accurate as of 6/20/18 10:25 AM.  Always use your most recent med list.                   Brand Name Dispense Instructions for use Diagnosis    calcium carbonate 500 MG tablet    OS-LAURI 500 mg [place]. [place]     Take 1 tablet by mouth 2  times daily        diphenoxylate-atropine 2.5-0.025 MG per tablet    LOMOTIL    30 tablet    Take 1-2 tablets by mouth 4 times daily as needed for diarrhea    Diarrhea, unspecified type       MULTI VITAMIN PO

## 2018-06-20 NOTE — PROGRESS NOTES
SUBJECTIVE:   CC: Meg Beverly is an 62 year old woman who presents for preventive health visit.     Physical   Annual:     Getting at least 3 servings of Calcium per day::  Yes    Bi-annual eye exam::  Yes    Dental care twice a year::  Yes    Sleep apnea or symptoms of sleep apnea::  Excessive snoring    Diet::  Other    Frequency of exercise::  2-3 days/week    Duration of exercise::  15-30 minutes    Taking medications regularly::  Not Applicable    Additional concerns today::  No              Today's PHQ-2 Score:   PHQ-2 ( 1999 Pfizer) 6/20/2018   Q1: Little interest or pleasure in doing things 0   Q2: Feeling down, depressed or hopeless 0   PHQ-2 Score 0   Q1: Little interest or pleasure in doing things -   Q2: Feeling down, depressed or hopeless -   PHQ-2 Score -       Abuse: Current or Past(Physical, Sexual or Emotional)- No  Do you feel safe in your environment - Yes    Social History   Substance Use Topics     Smoking status: Never Smoker     Smokeless tobacco: Never Used     Alcohol use Yes      Comment: Social     Alcohol Use 6/17/2018   If you drink alcohol do you typically have greater than 3 drinks per day OR greater than 7 drinks per week? No   No flowsheet data found.    Reviewed orders with patient.  Reviewed health maintenance and updated orders accordingly - Yes  Labs reviewed in EPIC  BP Readings from Last 3 Encounters:   06/20/18 102/76   06/06/18 128/64   01/17/18 126/82    Wt Readings from Last 3 Encounters:   06/20/18 125 lb 12.8 oz (57.1 kg)   06/06/18 126 lb 6.4 oz (57.3 kg)   03/21/18 135 lb (61.2 kg)                  Patient Active Problem List   Diagnosis     Osteopenia     Mixed hyperlipidemia     Midline cystocele     Functional diarrhea     Past Surgical History:   Procedure Laterality Date     COLONOSCOPY N/A 1/17/2018    Procedure: COMBINED COLONOSCOPY, SINGLE OR MULTIPLE BIOPSY/POLYPECTOMY BY BIOPSY;;  Surgeon: Curly Laura DO;  Location: [facility]     COLONOSCOPY WITH CO2  INSUFFLATION N/A 1/17/2018    Procedure: COLONOSCOPY WITH CO2 INSUFFLATION;  COLON-DIARRHEA,WEIGHT LOSS/ VANDER CHRISTINA;  Surgeon: Curly Laura DO;  Location: [facility] OR     GYN SURGERY      D & C     ORTHOPEDIC SURGERY Left 09/2014    meniscus repair       Social History   Substance Use Topics     Smoking status: Never Smoker     Smokeless tobacco: Never Used     Alcohol use Yes      Comment: Social     Family History   Problem Relation Age of Onset     Osteoperosis Mother      Rheumatoid Arthritis Mother      Cerebrovascular Disease Father      Hypertension Maternal Grandmother      Emphysema Maternal Grandfather      Coronary Artery Disease Paternal Grandmother      Alzheimer Disease Brother      Crohn Disease Brother          Allergies   Allergen Reactions     Latex Rash     Recent Labs   Lab Test  01/02/18   1137   ALT  37   CR  0.82   GFRESTIMATED  70   GFRESTBLACK  85   POTASSIUM  3.9   TSH  1.41        Patient over age 50, mutual decision to screen reflected in health maintenance.    Pertinent mammograms are reviewed under the imaging tab.  History of abnormal Pap smear: NO - age 30-65 PAP every 5 years with negative HPV co-testing recommended    Reviewed and updated as needed this visit by clinical staff  Tobacco  Allergies  Meds  Problems  Med Hx  Surg Hx  Fam Hx  Soc Hx          Reviewed and updated as needed this visit by Provider  Allergies  Meds  Problems            Review of Systems  CONSTITUTIONAL: NEGATIVE for fever, chills, change in weight  INTEGUMENTARY/SKIN: NEGATIVE for worrisome rashes, moles or lesions  EYES: NEGATIVE for vision changes or irritation  ENT: NEGATIVE for ear, mouth and throat problems  RESP: NEGATIVE for significant cough or SOB  BREAST: NEGATIVE for masses, tenderness or discharge  CV: NEGATIVE for chest pain, palpitations or peripheral edema  GI: NEGATIVE for nausea, abdominal pain, heartburn, or change in bowel habits  : NEGATIVE for unusual urinary or vaginal  "symptoms. No vaginal bleeding.  MUSCULOSKELETAL: NEGATIVE for significant arthralgias or myalgia  NEURO: NEGATIVE for weakness, dizziness or paresthesias  PSYCHIATRIC: NEGATIVE for changes in mood or affect      OBJECTIVE:   /76  Pulse 68  Temp 97.3  F (36.3  C) (Oral)  Resp 16  Ht 5' 3.3\" (1.608 m)  Wt 125 lb 12.8 oz (57.1 kg)  SpO2 99%  BMI 22.07 kg/m2  Physical Exam  GENERAL: healthy, alert and no distress  EYES: Eyes grossly normal to inspection, PERRL and conjunctivae and sclerae normal  HENT: ear canals and TM's normal, nose and mouth without ulcers or lesions  NECK: no adenopathy, no asymmetry, masses, or scars and thyroid normal to palpation  RESP: lungs clear to auscultation - no rales, rhonchi or wheezes  BREAST: normal without masses, tenderness or nipple discharge and no palpable axillary masses or adenopathy  CV: regular rate and rhythm, normal S1 S2, no S3 or S4, no murmur, click or rub, no peripheral edema and peripheral pulses strong  ABDOMEN: soft, nontender, no hepatosplenomegaly, no masses and bowel sounds normal   (female): normal female external genitalia, normal urethral meatus, vaginal mucosa, normal cervix/adnexa/uterus without masses or discharge  MS: no gross musculoskeletal defects noted, no edema  PSYCH: mentation appears normal, affect normal/bright    ASSESSMENT/PLAN:   1. Routine general medical examination at a health care facility    - Glucose    2. Need for hepatitis C screening test    - Hepatitis C Screen Reflex to HCV RNA Quant and Genotype    3. Screening for HIV (human immunodeficiency virus)    - HIV Screening    4. Cervical cancer screening    - Pap imaged thin layer screen with HPV - recommended age 30 - 65 years (select HPV order below)  - HPV High Risk Types DNA Cervical    5. Mixed hyperlipidemia    - Lipid panel reflex to direct LDL Fasting    6. Asymptomatic postmenopausal status    - DX Hip/Pelvis/Spine; Future    7. Encounter for screening mammogram for " "breast cancer    - *MA Screening Digital Bilateral; Future    COUNSELING:  Reviewed preventive health counseling, as reflected in patient instructions       Regular exercise       Healthy diet/nutrition       Osteoporosis Prevention/Bone Health       Consider Hep C screening for patients born between 1945 and 1965       HIV screeninx in teen years, 1x in adult years, and at intervals if high risk         reports that she has never smoked. She has never used smokeless tobacco.    Estimated body mass index is 22.07 kg/(m^2) as calculated from the following:    Height as of this encounter: 5' 3.3\" (1.608 m).    Weight as of this encounter: 125 lb 12.8 oz (57.1 kg).       Counseling Resources:  ATP IV Guidelines  Pooled Cohorts Equation Calculator  Breast Cancer Risk Calculator  FRAX Risk Assessment  ICSI Preventive Guidelines  Dietary Guidelines for Americans,   USDA's MyPlate  ASA Prophylaxis  Lung CA Screening    JACQUELIN Muhammad [facility]  Answers for HPI/ROS submitted by the patient on 2018   PHQ-2 Score: 0    "

## 2018-06-21 NOTE — PROGRESS NOTES
Dear Meg,    Your recent test results are attached.      No HIV.  No Hepatitis C.    The 2013 American College of Cardiology/ American Heart Association Guideline on the Treatment of Blood Cholesterol is the guideline that I use to determine if cholesterol lowering medication is needed.  Your estimated 10-year risk of atherosclerotic cardiovascular disease (i.e. Blocked arteries of the heart) is 3.3%.  According to your 10-year risk percentage, you DO NOT qualify for treatment with a cholesterol lowering medication (risk must be greater than 7.5%).    Normal glucose.      If you have any questions please feel free to contact [phone] or myself via [employer].    Sincerely,  Dara Norris, CNP

## 2018-06-22 ENCOUNTER — RADIANT APPOINTMENT (OUTPATIENT)
Dept: BONE DENSITY | Facility: CLINIC | Age: 63
End: 2018-06-22
Attending: NURSE PRACTITIONER
Payer: COMMERCIAL

## 2018-06-22 DIAGNOSIS — Z78.0 ASYMPTOMATIC POSTMENOPAUSAL STATUS: ICD-10-CM

## 2018-06-22 PROCEDURE — 77080 DXA BONE DENSITY AXIAL: CPT | Performed by: INTERNAL MEDICINE

## 2018-06-25 LAB
COPATH REPORT: NORMAL
PAP: NORMAL

## 2018-06-26 NOTE — PROGRESS NOTES
Dear Meg,    Your recent test results are attached.      Your DEXA scan shows osteoporosis.  There are several different treatment options for osteoporosis.  I know that you were just here, but the different treatments have many potential side effects and I think it would be best to discuss all of these in person so that you can make an informed decision regarding treatment.  Please schedule an appointment at your convenience.    If you have any questions please feel free to contact [phone] or myself via [employer].    Sincerely,  Dara Norris, CNP

## 2018-06-27 LAB
FINAL DIAGNOSIS: NORMAL
HPV HR 12 DNA CVX QL NAA+PROBE: NEGATIVE
HPV16 DNA SPEC QL NAA+PROBE: NEGATIVE
HPV18 DNA SPEC QL NAA+PROBE: NEGATIVE
SPECIMEN DESCRIPTION: NORMAL
SPECIMEN SOURCE CVX/VAG CYTO: NORMAL

## 2018-08-02 NOTE — PROGRESS NOTES
SUBJECTIVE:   Meg Beverly is a 62 year old female who presents to clinic today for the following health issues:      Chief Complaint   Patient presents with     Medication Question     osteoporosis     Patient was recently noted to have osteoporosis on her DEXA scan to right hip.  She is here to discuss options for treatment.  Patient denies history of GI bleed and is not expecting any extensive dental work.    Problem list and histories reviewed & adjusted, as indicated.  Additional history: as documented    Patient Active Problem List   Diagnosis     Mixed hyperlipidemia     Midline cystocele     Functional diarrhea     Age-related osteoporosis without current pathological fracture     Past Surgical History:   Procedure Laterality Date     COLONOSCOPY N/A 1/17/2018    Procedure: COMBINED COLONOSCOPY, SINGLE OR MULTIPLE BIOPSY/POLYPECTOMY BY BIOPSY;;  Surgeon: Curly Laura DO;  Location: [facility]     COLONOSCOPY WITH CO2 INSUFFLATION N/A 1/17/2018    Procedure: COLONOSCOPY WITH CO2 INSUFFLATION;  COLON-DIARRHEA,WEIGHT LOSS/ VANDER WAL;  Surgeon: Curly Laura DO;  Location: [facility]     GYN SURGERY      D & C     ORTHOPEDIC SURGERY Left 09/2014    meniscus repair       Social History   Substance Use Topics     Smoking status: Never Smoker     Smokeless tobacco: Never Used     Alcohol use Yes      Comment: Social     Family History   Problem Relation Age of Onset     Osteoperosis Mother      Rheumatoid Arthritis Mother      Cerebrovascular Disease Father      Hypertension Maternal Grandmother      Emphysema Maternal Grandfather      Coronary Artery Disease Paternal Grandmother      Alzheimer Disease Brother      Crohn Disease Brother          Current Outpatient Prescriptions   Medication Sig Dispense Refill     calcium carbonate (OS-LAURI 500 MG [place]. [place]) 1250 MG tablet Take 1 tablet by mouth 2 times daily       diphenoxylate-atropine (LOMOTIL) 2.5-0.025 MG per tablet Take 1-2 tablets by mouth 4 times daily  "as needed for diarrhea (Patient not taking: Reported on 6/6/2018) 30 tablet 1     Multiple Vitamin (MULTI VITAMIN PO)        Allergies   Allergen Reactions     Latex Rash     BP Readings from Last 3 Encounters:   08/03/18 100/60   06/20/18 102/76   06/06/18 128/64    Wt Readings from Last 3 Encounters:   08/03/18 118 lb 6.4 oz (53.7 kg)   06/20/18 125 lb 12.8 oz (57.1 kg)   06/06/18 126 lb 6.4 oz (57.3 kg)                  Labs reviewed in EPIC    Reviewed and updated as needed this visit by clinical staff       Reviewed and updated as needed this visit by Provider         ROS:  Constitutional, HEENT, cardiovascular, pulmonary, gi and gu systems are negative, except as otherwise noted.    OBJECTIVE:     /60  Pulse 81  Temp 98  F (36.7  C) (Oral)  Resp 16  Ht 5' 3.3\" (1.608 m)  Wt 118 lb 6.4 oz (53.7 kg)  SpO2 99%  BMI 20.78 kg/m2  Body mass index is 20.78 kg/(m^2).  GENERAL: healthy, alert and no distress  MS: no gross musculoskeletal defects noted, no edema  PSYCH: mentation appears normal, affect normal/bright    Diagnostic Test Results:  none     ASSESSMENT/PLAN:     1. Age-related osteoporosis without current pathological fracture  I discussed options of fosamax, reclast, prolia, and teriparatide with patient.  She is going to consider and will contact clinic for prescription after weighing different options.    Greater than15 min with greater than 50 % in counseling were spent with Meg Beverly.      FUTURE APPOINTMENTS:       - Follow-up for annual visit or as needed    JACQUELIN Muhammad [facility]    "

## 2018-08-03 ENCOUNTER — OFFICE VISIT (OUTPATIENT)
Dept: FAMILY MEDICINE | Facility: CLINIC | Age: 63
End: 2018-08-03
Payer: COMMERCIAL

## 2018-08-03 VITALS
OXYGEN SATURATION: 99 % | WEIGHT: 118.4 LBS | TEMPERATURE: 98 F | RESPIRATION RATE: 16 BRPM | DIASTOLIC BLOOD PRESSURE: 60 MMHG | HEART RATE: 81 BPM | BODY MASS INDEX: 20.98 KG/M2 | HEIGHT: 63 IN | SYSTOLIC BLOOD PRESSURE: 100 MMHG

## 2018-08-03 DIAGNOSIS — M81.0 AGE-RELATED OSTEOPOROSIS WITHOUT CURRENT PATHOLOGICAL FRACTURE: Primary | ICD-10-CM

## 2018-08-03 PROBLEM — M85.80 OSTEOPENIA: Status: RESOLVED | Noted: 2018-01-02 | Resolved: 2018-08-03

## 2018-08-03 PROCEDURE — 99213 OFFICE O/P EST LOW 20 MIN: CPT | Performed by: NURSE PRACTITIONER

## 2018-08-03 ASSESSMENT — PAIN SCALES - GENERAL: PAINLEVEL: NO PAIN (0)

## 2018-08-03 NOTE — PATIENT INSTRUCTIONS
[facility]    If you have any questions regarding to your visit please contact your care team:     Team Pink:   Clinic Hours Telephone Number   Internal Medicine:  Dr. Esther Norris NP       7am-7pm  Monday - Thursday   7am-5pm  Fridays  [phone]  (Appointment scheduling available 24/7)    Questions about your recent visit?  Team Line  [phone]   Urgent Care - [place] and [place] - 11am-9pm Monday-Friday Saturday-Sunday- 9am-5pm   [place] - 5pm-9pm Monday-Friday Saturday-Sunday- 9am-5pm  [phone] - Kami Holder  [phone] - [place]       What options do I have for a visit other than an office visit? We offer electronic visits (e-visits) and telephone visits, when medically appropriate.  Please check with your medical insurance to see if these types of visits are covered, as you will be responsible for any charges that are not paid by your insurance.      You can use [employer] (secure electronic communication) to access to your chart, send your primary care provider a message, or make an appointment. Ask a team member how to get started.     For a price quote for your services, please call our Consumer Price Line at [phone] or our Imaging Cost estimation line at [phone] (for imaging tests).      MADI Kirk

## 2018-08-03 NOTE — MR AVS SNAPSHOT
After Visit Summary   8/3/2018    Meg Beverly    MRN: [id number]           Patient Information     Date Of Birth          1955        Visit Information        Provider Department      8/3/2018 10:00 AM Dara Norris APRN CNP [facility]        Today's Diagnoses     Age-related osteoporosis without current pathological fracture    -  1      Care Instructions    [facility]    If you have any questions regarding to your visit please contact your care team:     Team Pink:   Clinic Hours Telephone Number   Internal Medicine:  Dr. Esther Norris, NP       7am-7pm  Monday - Thursday   7am-5pm  Fridays  [phone]  (Appointment scheduling available 24/7)    Questions about your recent visit?  Team Line  [phone]   Urgent Care - [place] and [facility] - 11am-9pm Monday-Friday Saturday-Sunday- 9am-5pm   [place] - 5pm-9pm Monday-Friday Saturday-Sunday- 9am-5pm  [phone] - [place]  [phone] - [place]       What options do I have for a visit other than an office visit? We offer electronic visits (e-visits) and telephone visits, when medically appropriate.  Please check with your medical insurance to see if these types of visits are covered, as you will be responsible for any charges that are not paid by your insurance.      You can use [employer] (secure electronic communication) to access to your chart, send your primary care provider a message, or make an appointment. Ask a team member how to get started.     For a price quote for your services, please call our Consumer Price Line at [phone] or our Imaging Cost estimation line at [phone] (for imaging tests).      MADI Kirk            Follow-ups after your visit        Who to contact     If you have questions or need follow up information about today's clinic visit or your schedule please contact [facility] directly at  "[phone].  Normal or non-critical lab and imaging results will be communicated to you by MyChart, letter or phone within 4 business days after the clinic has received the results. If you do not hear from us within 7 days, please contact the clinic through [employer] or phone. If you have a critical or abnormal lab result, we will notify you by phone as soon as possible.  Submit refill requests through [employer] or call your pharmacy and they will forward the refill request to us. Please allow 3 business days for your refill to be completed.          Additional Information About Your Visit        V[employer]art Information     [employer] gives you secure access to your electronic health record. If you see a primary care provider, you can also send messages to your care team and make appointments. If you have questions, please call your primary care clinic.  If you do not have a primary care provider, please call [phone] and they will assist you.        Care EveryWhere ID     This is your Care EveryWhere ID. This could be used by other organizations to access your [place] medical records  [id number]        Your Vitals Were     Pulse Temperature Respirations Height Pulse Oximetry BMI (Body Mass Index)    81 98  F (36.7  C) (Oral) 16 5' 3.3\" (1.608 m) 99% 20.78 kg/m2       Blood Pressure from Last 3 Encounters:   08/03/18 100/60   06/20/18 102/76   06/06/18 128/64    Weight from Last 3 Encounters:   08/03/18 118 lb 6.4 oz (53.7 kg)   06/20/18 125 lb 12.8 oz (57.1 kg)   06/06/18 126 lb 6.4 oz (57.3 kg)              Today, you had the following     No orders found for display       Primary Care Provider Office Phone # Fax #    Dara JACQUELIN Sibley [phone]       [address] [place] [address]        Equal Access to Services     KIARA BADILLO : Hadii celestine seay hadasho Soomaali, waaxda luqadaha, qaybta kaalmada adeegyada, alexandro mccloud . So [employer] " [phone].    ATENCIÓN: Si ramon black, tiene a barajas disposición servicios gratuitos de asistencia lingüística. Jessica karimi [phone].    We comply with applicable federal civil rights laws and [place] laws. We do not discriminate on the basis of race, color, national origin, age, disability, sex, sexual orientation, or gender identity.            Thank you!     Thank you for choosing [facility]  for your care. Our goal is always to provide you with excellent care. Hearing back from our patients is one way we can continue to improve our services. Please take a few minutes to complete the written survey that you may receive in the mail after your visit with us. Thank you!             Your Updated Medication List - Protect others around you: Learn how to safely use, store and throw away your medicines at www.disposemymeds.org.          This list is accurate as of 8/3/18 10:44 AM.  Always use your most recent med list.                   Brand Name Dispense Instructions for use Diagnosis    calcium carbonate 500 MG tablet    OS-LAURI 500 mg [place]. [place]     Take 1 tablet by mouth 2 times daily        diphenoxylate-atropine 2.5-0.025 MG per tablet    LOMOTIL    30 tablet    Take 1-2 tablets by mouth 4 times daily as needed for diarrhea    Diarrhea, unspecified type       MULTI VITAMIN PO

## 2018-09-28 ENCOUNTER — ALLIED HEALTH/NURSE VISIT (OUTPATIENT)
Dept: NURSING | Facility: CLINIC | Age: 63
End: 2018-09-28
Payer: COMMERCIAL

## 2018-09-28 DIAGNOSIS — Z23 NEED FOR PROPHYLACTIC VACCINATION AND INOCULATION AGAINST INFLUENZA: Primary | ICD-10-CM

## 2018-09-28 PROCEDURE — 90471 IMMUNIZATION ADMIN: CPT

## 2018-09-28 PROCEDURE — 99207 ZZC NO CHARGE NURSE ONLY: CPT

## 2018-09-28 PROCEDURE — 90682 RIV4 VACC RECOMBINANT DNA IM: CPT

## 2018-09-28 NOTE — MR AVS SNAPSHOT
After Visit Summary   9/28/2018    Meg Beverly    MRN: [id number]           Patient Information     Date Of Birth          1955        Visit Information        Provider Department      9/28/2018 12:00 PM [facility]        Today's Diagnoses     Need for prophylactic vaccination and inoculation against influenza    -  1       Follow-ups after your visit        Who to contact     If you have questions or need follow up information about today's clinic visit or your schedule please [facility] at [phone].  Normal or non-critical lab and imaging results will be communicated to you by [employer], letter or phone within 4 business days after the clinic has received the results. If you do not hear from us within 7 days, please contact the clinic through [employer] or phone. If you have a critical or abnormal lab result, we will notify you by phone as soon as possible.  Submit refill requests through [employer] or call your pharmacy and they will forward the refill request to us. Please allow 3 business days for your refill to be completed.          Additional Information About Your Visit        MyChart Information     [employer] gives you secure access to your electronic health record. If you see a primary care provider, you can also send messages to your care team and make appointments. If you have questions, please call your primary care clinic.  If you do not have a primary care provider, please call [phone] and they will assist you.        Care EveryWhere ID     This is your Care EveryWhere ID. This could be used by other organizations to access your [place] medical records  [id number]         Blood Pressure from Last 3 Encounters:   08/03/18 100/60   06/20/18 102/76   06/06/18 128/64    Weight from Last 3 Encounters:   08/03/18 118 lb 6.4 oz (53.7 kg)   06/20/18 125 lb 12.8 oz (57.1 kg)   06/06/18 126 lb 6.4 oz (57.3 kg)              We  Performed the Following     FLU VACCINE, (RIV4) RECOMBINANT HA  , IM (FluBlok, egg free) [05141]- >18 YRS (FMG recommended  50-64 YRS)     Vaccine Administration, Initial [05903]        Primary Care Provider Office Phone # Fax #    JACQUELIN Tinoco [facility] [phone] [address] [address]        Equal Access to Services     KIARA BADILLO : Hadii aad ku hadasho Soomaali, waaxda luqadaha, qaybta kaalmada adeegyada, waxay idiin hayaan adebelkis reesarash ame . So [facility] [phone].    ATENCIÓN: Si lindseyla sofia, tiene a barajas disposición servicios gratuitos de asistencia lingüística. Jessica al [phone].    We comply with applicable federal civil rights laws and [place] laws. We do not discriminate on the basis of race, color, national origin, age, disability, sex, sexual orientation, or gender identity.            Thank you!     Thank you for choosing [facility]  for your care. Our goal is always to provide you with excellent care. Hearing back from our patients is one way we can continue to improve our services. Please take a few minutes to complete the written survey that you may receive in the mail after your visit with us. Thank you!             Your Updated Medication List - Protect others around you: Learn how to safely use, store and throw away your medicines at www.disposemymeds.org.          This list is accurate as of 9/28/18 12:16 PM.  Always use your most recent med list.                   Brand Name Dispense Instructions for use Diagnosis    calcium carbonate 500 mg {elemental} 500 MG tablet    OS-LAURI     Take 1 tablet by mouth 2 times daily        diphenoxylate-atropine 2.5-0.025 MG per tablet    LOMOTIL    30 tablet    Take 1-2 tablets by mouth 4 times daily as needed for diarrhea    Diarrhea, unspecified type       MULTI VITAMIN PO

## 2018-09-28 NOTE — PROGRESS NOTES
Injectable Influenza Immunization Documentation    1.  Is the person to be vaccinated sick today?   No    2. Does the person to be vaccinated have an allergy to a component   of the vaccine?   No  Egg Allergy Algorithm Link    3. Has the person to be vaccinated ever had a serious reaction   to influenza vaccine in the past?   No    4. Has the person to be vaccinated ever had Guillain-Barré syndrome?   No    Form completed by EVELINA Kwan CMA ([facility])

## 2018-12-12 ENCOUNTER — ANCILLARY PROCEDURE (OUTPATIENT)
Dept: CT IMAGING | Facility: CLINIC | Age: 63
End: 2018-12-12
Attending: NURSE PRACTITIONER
Payer: COMMERCIAL

## 2018-12-12 ENCOUNTER — TELEPHONE (OUTPATIENT)
Dept: FAMILY MEDICINE | Facility: CLINIC | Age: 63
End: 2018-12-12

## 2018-12-12 ENCOUNTER — OFFICE VISIT (OUTPATIENT)
Dept: FAMILY MEDICINE | Facility: CLINIC | Age: 63
End: 2018-12-12
Payer: COMMERCIAL

## 2018-12-12 ENCOUNTER — TRANSFERRED RECORDS (OUTPATIENT)
Dept: HEALTH INFORMATION MANAGEMENT | Facility: CLINIC | Age: 63
End: 2018-12-12

## 2018-12-12 VITALS
BODY MASS INDEX: 22.39 KG/M2 | WEIGHT: 126.4 LBS | TEMPERATURE: 98.2 F | HEART RATE: 120 BPM | DIASTOLIC BLOOD PRESSURE: 68 MMHG | SYSTOLIC BLOOD PRESSURE: 116 MMHG | HEIGHT: 63 IN

## 2018-12-12 DIAGNOSIS — Z23 NEED FOR PNEUMOCOCCAL VACCINATION: ICD-10-CM

## 2018-12-12 DIAGNOSIS — R10.32 LLQ ABDOMINAL PAIN: ICD-10-CM

## 2018-12-12 DIAGNOSIS — K57.92 ACUTE DIVERTICULITIS: Primary | ICD-10-CM

## 2018-12-12 DIAGNOSIS — R30.0 DYSURIA: ICD-10-CM

## 2018-12-12 DIAGNOSIS — R10.31 RLQ ABDOMINAL PAIN: ICD-10-CM

## 2018-12-12 LAB
ALBUMIN UR-MCNC: NEGATIVE MG/DL
APPEARANCE UR: CLEAR
BACTERIA #/AREA URNS HPF: ABNORMAL /HPF
BASOPHILS # BLD AUTO: 0 10E9/L (ref 0–0.2)
BASOPHILS NFR BLD AUTO: 0.1 %
BILIRUB UR QL STRIP: NEGATIVE
COLOR UR AUTO: YELLOW
DIFFERENTIAL METHOD BLD: ABNORMAL
EOSINOPHIL # BLD AUTO: 0.1 10E9/L (ref 0–0.7)
EOSINOPHIL NFR BLD AUTO: 0.6 %
ERYTHROCYTE [DISTWIDTH] IN BLOOD BY AUTOMATED COUNT: 12.8 % (ref 10–15)
GLUCOSE UR STRIP-MCNC: NEGATIVE MG/DL
HCT VFR BLD AUTO: 38.4 % (ref 35–47)
HGB BLD-MCNC: 12.9 G/DL (ref 11.7–15.7)
HGB UR QL STRIP: ABNORMAL
KETONES UR STRIP-MCNC: NEGATIVE MG/DL
LEUKOCYTE ESTERASE UR QL STRIP: ABNORMAL
LYMPHOCYTES # BLD AUTO: 1.9 10E9/L (ref 0.8–5.3)
LYMPHOCYTES NFR BLD AUTO: 14.2 %
MCH RBC QN AUTO: 31.1 PG (ref 26.5–33)
MCHC RBC AUTO-ENTMCNC: 33.6 G/DL (ref 31.5–36.5)
MCV RBC AUTO: 93 FL (ref 78–100)
MONOCYTES # BLD AUTO: 1.1 10E9/L (ref 0–1.3)
MONOCYTES NFR BLD AUTO: 8.1 %
NEUTROPHILS # BLD AUTO: 10.5 10E9/L (ref 1.6–8.3)
NEUTROPHILS NFR BLD AUTO: 77 %
NITRATE UR QL: NEGATIVE
NON-SQ EPI CELLS #/AREA URNS LPF: ABNORMAL /LPF
PH UR STRIP: 7 PH (ref 5–7)
PLATELET # BLD AUTO: 296 10E9/L (ref 150–450)
RBC # BLD AUTO: 4.15 10E12/L (ref 3.8–5.2)
RBC #/AREA URNS AUTO: ABNORMAL /HPF
SOURCE: ABNORMAL
SP GR UR STRIP: 1.01 (ref 1–1.03)
UROBILINOGEN UR STRIP-ACNC: 0.2 EU/DL (ref 0.2–1)
WBC # BLD AUTO: 13.7 10E9/L (ref 4–11)
WBC #/AREA URNS AUTO: ABNORMAL /HPF

## 2018-12-12 PROCEDURE — G0009 ADMIN PNEUMOCOCCAL VACCINE: HCPCS | Performed by: NURSE PRACTITIONER

## 2018-12-12 PROCEDURE — 99214 OFFICE O/P EST MOD 30 MIN: CPT | Mod: 25 | Performed by: NURSE PRACTITIONER

## 2018-12-12 PROCEDURE — 90670 PCV13 VACCINE IM: CPT | Performed by: NURSE PRACTITIONER

## 2018-12-12 PROCEDURE — 36415 COLL VENOUS BLD VENIPUNCTURE: CPT | Performed by: NURSE PRACTITIONER

## 2018-12-12 PROCEDURE — 85025 COMPLETE CBC W/AUTO DIFF WBC: CPT | Performed by: NURSE PRACTITIONER

## 2018-12-12 PROCEDURE — 74177 CT ABD & PELVIS W/CONTRAST: CPT | Mod: TC

## 2018-12-12 PROCEDURE — 81001 URINALYSIS AUTO W/SCOPE: CPT | Performed by: NURSE PRACTITIONER

## 2018-12-12 RX ORDER — NITROFURANTOIN 25; 75 MG/1; MG/1
100 CAPSULE ORAL 2 TIMES DAILY
Qty: 180 CAPSULE | Refills: 3 | Status: SHIPPED | OUTPATIENT
Start: 2018-12-12 | End: 2018-12-12

## 2018-12-12 RX ORDER — NITROFURANTOIN 25; 75 MG/1; MG/1
100 CAPSULE ORAL 2 TIMES DAILY
Qty: 14 CAPSULE | Refills: 0 | Status: SHIPPED | OUTPATIENT
Start: 2018-12-12 | End: 2019-02-08

## 2018-12-12 RX ORDER — IOPAMIDOL 755 MG/ML
62 INJECTION, SOLUTION INTRAVASCULAR ONCE
Status: COMPLETED | OUTPATIENT
Start: 2018-12-12 | End: 2018-12-12

## 2018-12-12 RX ADMIN — IOPAMIDOL 62 ML: 755 INJECTION, SOLUTION INTRAVASCULAR at 15:40

## 2018-12-12 ASSESSMENT — MIFFLIN-ST. JEOR: SCORE: 1092.24

## 2018-12-12 NOTE — TELEPHONE ENCOUNTER
[facility] is calling needing clarification on the nitroFURantoin macrocrystal-monohydrate (MACROBID) 100 MG capsule. Pt is saying is should only be for 7 days. Please call Anusha at [facility] at [phone]  [facility],

## 2018-12-12 NOTE — TELEPHONE ENCOUNTER
I called and left patient a voicemail stating that the blood work from today shows changes that are concerning and that I want her to get an Abdominal CT scan today.  I asked patient to call back to team sandra [place].    Fabiana Harper, LALITHA, APRN, CNP

## 2018-12-12 NOTE — PATIENT INSTRUCTIONS
[facility]   Discharged by : Shelley Link MA  Paper scripts provided to patient : none     If you have any questions regarding your visit please contact your care team:     Team Gold                Clinic Hours Telephone Number     Dr. Jacqueline Harper, DILIA Camachonifer Chinedu, CNP 7am-7pm  Monday - Thursday   7am-5pm  Fridays  [phone]   (Appointment scheduling available 24/7)     RN Line  [phone] option 2     Urgent Care - [place] - 11am-9pm Monday-Friday Saturday-Sunday- 9am-5pm     [place] -   5pm-9pm Monday-Friday Saturday-Sunday- 9am-5pm    [phone] - [place]    [phone] - [place]       For a Price Quote for your services, please call our [employer] Price Line at [phone].     What options do I have for visits at the clinic other than the traditional office visit?     To expand how we care for you, many of our providers are utilizing electronic visits (e-visits) and telephone visits, when medically appropriate, for interactions with their patients rather than a visit in the clinic. We also offer nurse visits for many medical concerns. Just like any other service, we will bill your insurance company for this type of visit based on time spent on the phone with your provider. Not all insurance companies cover these visits. Please check with your medical insurance if this type of visit is covered. You will be responsible for any charges that are not paid by your insurance.   E-visits via [employer]: generally incur a $35.00 fee.     Telephone visits:  Time spent on the phone: *charged based on time that is spent on the phone in increments of 10 minutes. Estimated cost:   5-10 mins $30.00   11-20 mins. $59.00   21-30 mins. $85.00       Use [employer] (secure email communication and access to your chart) to send your primary care provider a message or make an appointment. Ask someone  on your Team how to sign up for [facility].     As always, Thank you for trusting us with your health care needs!      [facility]:    Scheduling Appointments  [place]  Call: [phone]    [facility]  Call: [phone]    [facility]  Call: [phone]    For Gastroenterology referrals   [facility]   Clinics and Surgery [place], 4th Floor   [address] [place] [address]   Appointments: [phone]    WHERE TO GO FOR CARE?  Clinic    Make an appointment if you:       Are sick (cold, cough, flu, sore throat, earache or in pain).       Have a small injury (sprain, small cut, burn or broken bone).       Need a physical exam, Pap smear, vaccine or prescription refill.       Have questions about your health or medicines.    To reach us:      Call [phone] ([phone]). Open 24 hours every day. (For counseling services, call [phone].)    Log into [facility] at [email or web address]. (Visit [email or web address] to create an account.) Hospital emergency room    An emergency is a serious or life- threatening problem that must be treated right away.    Call 053 or get to the hospital if you have:      Very bad or sudden:            - Chest pain or pressure         - Bleeding         - Head or belly pain         - Dizziness or trouble seeing, walking or                          Speaking      Problems breathing      Blood in your vomit or you are coughing up blood      A major injury (knocked out, loss of a finger or limb, rape, broken bone protruding from skin)    A mental health crisis. (Or call the Mental Health Crisis line at 1-273.594.2353 or Suicide Prevention Hotline at 1-840.380.6684.)    Open 24 hours every day. You don't need an appointment.     Urgent care    Visit urgent care for sickness or small injuries when the clinic is closed. You don't need an appointment. To check hours or  find an urgent care near you, visit [email or web address]. Online care    Get online care from [facility] for more than 70 common problems, like colds, allergies and infections. Open 24 hours every day at:   [email or web address]   Need help deciding?    For advice about where to be seen, you may call your clinic and ask to speak with a nurse. We're here for you 24 hours every day.         If you are deaf or hard of hearing, please let us know. We provide many free services including sign language interpreters, oral interpreters, TTYs, telephone amplifiers, note takers and written materials.

## 2018-12-12 NOTE — TELEPHONE ENCOUNTER
"I called and spoke with patient about the Abdominal/Pelvic CT scan results\"                                                                   \"IMPRESSION: Acute diverticulitis with possible small, contained perforation but no drainable abscess or free intraperitoneal air.\"    I recommended patient to go to the emergency room for evaluation and she plans to go to the [facility].  All questions were answered.    Fabiana Harper, DNP, APRN, CNP  "

## 2018-12-12 NOTE — PROGRESS NOTES
SUBJECTIVE:   Meg Beverly is a 63 year old female who presents to clinic today for the following health issues:      URINARY TRACT SYMPTOMS  Onset: Monday (2 days ago)    Description:   Painful urination (Dysuria): YES  Blood in urine (Hematuria): no   Delay in urine (Hesitency): YES    Intensity: severe    Progression of Symptoms:  worsening    Accompanying Signs & Symptoms:  Fever/chills: YES-  Yesterday low grade  Flank pain no   Nausea and vomiting: YES-  nausea  Any vaginal symptoms: none  Abdominal/Pelvic Pain: YES    History:   History of frequent UTI's: no   History of kidney stones: no   Sexually Active: no   Possibility of pregnancy: No    Precipitating factors:   None    Therapies Tried and outcome: OTC advil or tylenol , fluids  Sharp pains in lower abdomen just before she voids.  Some abdominal discomfort as well, bilateral lower abdomen and suprapubic area.  Yesterday had a fever 100.9 and chills.  Also has nausea.  Denies blood in stool, diarrhea, or constipation.    Problem list and histories reviewed & adjusted, as indicated.  Additional history: as documented    Patient Active Problem List   Diagnosis     Mixed hyperlipidemia     Midline cystocele     Functional diarrhea     Age-related osteoporosis without current pathological fracture     Past Surgical History:   Procedure Laterality Date     COLONOSCOPY N/A 1/17/2018    Procedure: COMBINED COLONOSCOPY, SINGLE OR MULTIPLE BIOPSY/POLYPECTOMY BY BIOPSY;;  Surgeon: Curly Laura DO;  Location: [facility] OR     COLONOSCOPY WITH CO2 INSUFFLATION N/A 1/17/2018    Procedure: COLONOSCOPY WITH CO2 INSUFFLATION;  COLON-DIARRHEA,WEIGHT LOSS/ VANDER WAL;  Surgeon: Curly Laura DO;  Location:  OR     GYN SURGERY      D & C     ORTHOPEDIC SURGERY Left 09/2014    meniscus repair       Social History     Tobacco Use     Smoking status: Never Smoker     Smokeless tobacco: Never Used   Substance Use Topics     Alcohol use: Yes     Comment: Social      "Family History   Problem Relation Age of Onset     Osteoporosis Mother      Rheumatoid Arthritis Mother      Cerebrovascular Disease Father      Hypertension Maternal Grandmother      Emphysema Maternal Grandfather      Coronary Artery Disease Paternal Grandmother      Alzheimer Disease Brother      Crohn's Disease Brother          Current Outpatient Medications   Medication Sig Dispense Refill     calcium carbonate (OS-LAURI 500 MG [place]. [place]) 1250 MG tablet Take 1 tablet by mouth 2 times daily       Multiple Vitamin (MULTI VITAMIN PO)        nitroFURantoin macrocrystal-monohydrate (MACROBID) 100 MG capsule Take 1 capsule (100 mg) by mouth 2 times daily 180 capsule 3     Allergies   Allergen Reactions     Fosamax [Alendronic Acid]      mild tingling and swelling of the lips, itchiness/tingling in my nose, and overall itchiness of the scalp, neck, and ears, along with a mild feeling of being short of breath     Latex Rash     BP Readings from Last 3 Encounters:   12/12/18 116/68   08/03/18 100/60   06/20/18 102/76    Wt Readings from Last 3 Encounters:   12/12/18 57.3 kg (126 lb 6.4 oz)   08/03/18 53.7 kg (118 lb 6.4 oz)   06/20/18 57.1 kg (125 lb 12.8 oz)                    Reviewed and updated as needed this visit by clinical staff  Tobacco  Allergies  Meds  Problems  Med Hx  Surg Hx  Fam Hx  Soc Hx        Reviewed and updated as needed this visit by Provider  Tobacco  Allergies  Meds  Problems  Med Hx  Surg Hx  Fam Hx         ROS:  Constitutional, HEENT, cardiovascular, pulmonary, gi and gu systems are negative, except as otherwise noted.    OBJECTIVE:     /68 (BP Location: Right arm, Patient Position: Sitting, Cuff Size: Adult Regular)   Pulse 120   Temp 98.2  F (36.8  C) (Oral)   Ht 1.608 m (5' 3.3\")   Wt 57.3 kg (126 lb 6.4 oz)   BMI 22.18 kg/m    Body mass index is 22.18 kg/m .  GENERAL: healthy, alert and no distress  RESP: lungs clear to auscultation - no rales, rhonchi or wheezes  CV: " "regular rate and rhythm, normal S1 S2, no S3 or S4, no murmur, click or rub, no peripheral edema and peripheral pulses strong  ABDOMEN: tenderness in LLQ, RLQ and suprapubic area, no organomegaly or masses and bowel sounds normal.  MS: no gross musculoskeletal defects noted, no edema  PSYCH: mentation appears normal, affect normal/bright    Abdominal/Pelvic CT scan scheduled today at 3:30 pm:  \"FINDINGS: There is a calcified granuloma at the right lung base. There  are low-density liver lesions, largest of which is clearly a cyst. The  others are too small to characterize but statistically likely to be  benign. The gallbladder, spleen, pancreas, and adrenal glands are  unremarkable. There is no hydronephrosis or solid renal mass. The  appendix is not definitely visualized although there is no secondary  evidence of appendicitis.     There is colonic wall thickening associated with diverticulosis. There  is pericolonic infiltration in the left pelvis. Mixed air collection  measuring 1.6 cm (series 2, image 62) could represent a small  contained perforation. There is no free intraperitoneal air or  ascites. No drainable abscess.     No abdominal or retroperitoneal adenopathy. No enlarged pelvic lymph  nodes. The uterus is absent. Visualized bones are unremarkable.                                                                   IMPRESSION: Acute diverticulitis with possible small, contained  perforation but no drainable abscess or free intraperitoneal air.\"    Diagnostic Test Results:  Results for orders placed or performed in visit on 12/12/18   UA reflex to Microscopic and Culture   Result Value Ref Range    Color Urine Yellow     Appearance Urine Clear     Glucose Urine Negative NEG^Negative mg/dL    Bilirubin Urine Negative NEG^Negative    Ketones Urine Negative NEG^Negative mg/dL    Specific Gravity Urine 1.010 1.003 - 1.035    Blood Urine Moderate (A) NEG^Negative    pH Urine 7.0 5.0 - 7.0 pH    Protein Albumin " Urine Negative NEG^Negative mg/dL    Urobilinogen Urine 0.2 0.2 - 1.0 EU/dL    Nitrite Urine Negative NEG^Negative    Leukocyte Esterase Urine Small (A) NEG^Negative    Source Midstream Urine    Urine Microscopic   Result Value Ref Range    WBC Urine 0 - 5 OTO5^0 - 5 /HPF    RBC Urine 2-5 (A) OTO2^O - 2 /HPF    Squamous Epithelial /LPF Urine Few FEW^Few /LPF    Bacteria Urine Few (A) NEG^Negative /HPF   CBC with platelets and differential   Result Value Ref Range    WBC 13.7 (H) 4.0 - 11.0 10e9/L    RBC Count 4.15 3.8 - 5.2 10e12/L    Hemoglobin 12.9 11.7 - 15.7 g/dL    Hematocrit 38.4 35.0 - 47.0 %    MCV 93 78 - 100 fl    MCH 31.1 26.5 - 33.0 pg    MCHC 33.6 31.5 - 36.5 g/dL    RDW 12.8 10.0 - 15.0 %    Platelet Count 296 150 - 450 10e9/L    % Neutrophils 77.0 %    % Lymphocytes 14.2 %    % Monocytes 8.1 %    % Eosinophils 0.6 %    % Basophils 0.1 %    Absolute Neutrophil 10.5 (H) 1.6 - 8.3 10e9/L    Absolute Lymphocytes 1.9 0.8 - 5.3 10e9/L    Absolute Monocytes 1.1 0.0 - 1.3 10e9/L    Absolute Eosinophils 0.1 0.0 - 0.7 10e9/L    Absolute Basophils 0.0 0.0 - 0.2 10e9/L    Diff Method Automated Method        ASSESSMENT/PLAN:     1. Acute diverticulitis    2. RLQ abdominal pain    - CT Abdomen Pelvis w Contrast; Future    3. LLQ abdominal pain    - CT Abdomen Pelvis w Contrast; Future    4. Dysuria    - UA reflex to Microscopic and Culture  - Urine Microscopic    5. Need for pneumococcal vaccination    - ADMIN MEDICARE: Pneumococcal Vaccine ()    Given the CT findings of acute diverticulitis with possible small, contained perforation, I did call patient with these results and recommended that she goes the ED for further evaluation/management.  Patient is in agreement with this plan.    Fabiana Harper NP  [facility]

## 2018-12-12 NOTE — NURSING NOTE
Prior to injection, verified patient identity using patient's name and date of birth.  Due to injection administration, patient instructed to remain in clinic for 15 minutes  afterwards, and to report any adverse reaction to me immediately.    Screening Questionnaire for Adult Immunization    Are you sick today?   No   Do you have allergies to medications, food, a vaccine component or latex?   No   Have you ever had a serious reaction after receiving a vaccination?   No   Do you have a long-term health problem with heart disease, lung disease, asthma, kidney disease, metabolic disease (e.g. diabetes), anemia, or other blood disorder?   No   Do you have cancer, leukemia, HIV/AIDS, or any other immune system problem?   No   In the past 3 months, have you taken medications that affect  your immune system, such as prednisone, other steroids, or anticancer drugs; drugs for the treatment of rheumatoid arthritis, Crohn s disease, or psoriasis; or have you had radiation treatments?   No   Have you had a seizure, or a brain or other nervous system problem?   No   During the past year, have you received a transfusion of blood or blood     products, or been given immune (gamma) globulin or antiviral drug?   No   For women: Are you pregnant or is there a chance you could become        pregnant during the next month?   No   Have you received any vaccinations in the past 4 weeks?   No     Immunization questionnaire answers were all negative.        Per orders of Fabiana Harper NP, injection of PCV13 given by Shelley Link. Patient instructed to remain in clinic for 15 minutes afterwards, and to report any adverse reaction to me immediately.       Screening performed by Shelley Link on 12/12/2018 at 11:40 AM.      Drug Amount Wasted:  None.  Vial/Syringe: Syringe

## 2018-12-16 LAB
CREAT SERPL-MCNC: 0.91 MG/DL (ref 0.55–1.02)
GFR SERPL CREATININE-BSD FRML MDRD: >60 ML/MIN/1.73M2
GLUCOSE SERPL-MCNC: 106 MG/DL (ref 70–110)
POTASSIUM SERPL-SCNC: 3.8 MMOL/L (ref 3.5–5.1)

## 2018-12-19 ENCOUNTER — MYC MEDICAL ADVICE (OUTPATIENT)
Dept: FAMILY MEDICINE | Facility: CLINIC | Age: 63
End: 2018-12-19

## 2018-12-19 DIAGNOSIS — K57.20 DIVERTICULITIS OF LARGE INTESTINE WITH PERFORATION WITHOUT BLEEDING: ICD-10-CM

## 2018-12-20 NOTE — TELEPHONE ENCOUNTER
"Routed \"thank you\" my chart message to provider as FYI close if no further action needed.  Evita Mera,Clinic Rn  [facility]    "

## 2019-01-02 ENCOUNTER — OFFICE VISIT (OUTPATIENT)
Dept: SURGERY | Facility: CLINIC | Age: 64
End: 2019-01-02
Payer: COMMERCIAL

## 2019-01-02 VITALS
DIASTOLIC BLOOD PRESSURE: 87 MMHG | HEART RATE: 86 BPM | HEIGHT: 63 IN | SYSTOLIC BLOOD PRESSURE: 142 MMHG | BODY MASS INDEX: 20.91 KG/M2 | WEIGHT: 118 LBS

## 2019-01-02 DIAGNOSIS — K57.32 DIVERTICULITIS OF COLON: Primary | ICD-10-CM

## 2019-01-02 PROCEDURE — 99214 OFFICE O/P EST MOD 30 MIN: CPT | Performed by: SURGERY

## 2019-01-02 ASSESSMENT — MIFFLIN-ST. JEOR: SCORE: 1049.37

## 2019-01-02 NOTE — PROGRESS NOTES
General Surgery Follow Up    Pt returns for follow up visit after hospitalization for diverticulitis    HPI:  Discharged 12/16/18 on oral antibiotics. Antibiotics finished 12/22/18  No fevers  Bowels moving well  Still can get some pain- right lower quadrant area (which is where main pain was before as well). Happens randomly, few times a week.  Otherwise feeling back to normal  This was first episode of diverticulitis  Last colonoscopy 1 year ago- did have adenoma    Review Of Systems    Skin: negative  Ears/Nose/Throat: negative  Respiratory: No shortness of breath, dyspnea on exertion, cough, or hemoptysis  Cardiovascular: negative  Gastrointestinal: as above and history of diarrhea/IBS issues 20+ years  Genitourinary: negative  Musculoskeletal: negative  Neurologic: negative  Hematologic/Lymphatic/Immunologic: negative  Endocrine: negative      No past medical history on file.   Patient Active Problem List   Diagnosis     Mixed hyperlipidemia     Midline cystocele     Functional diarrhea     Age-related osteoporosis without current pathological fracture     Diverticulitis of colon with perforation       Past Surgical History:   Procedure Laterality Date     COLONOSCOPY N/A 1/17/2018    Procedure: COMBINED COLONOSCOPY, SINGLE OR MULTIPLE BIOPSY/POLYPECTOMY BY BIOPSY;;  Surgeon: Curly Laura DO;  Location: [facility] OR     COLONOSCOPY WITH CO2 INSUFFLATION N/A 1/17/2018    Procedure: COLONOSCOPY WITH CO2 INSUFFLATION;  COLON-DIARRHEA,WEIGHT LOSS/ VANDER WAL;  Surgeon: Curly Laura DO;  Location: [facility] OR     GYN SURGERY      D & C     ORTHOPEDIC SURGERY Left 09/2014    meniscus repair       Social History     Socioeconomic History     Marital status:      Spouse name: Not on file     Number of children: Not on file     Years of education: Not on file     Highest education level: Not on file   Social Needs     Financial resource strain: Not on file     Food insecurity - worry: Not on file     Food insecurity  "- inability: Not on file     Transportation needs - medical: Not on file     Transportation needs - non-medical: Not on file   Occupational History     Not on file   Tobacco Use     Smoking status: Never Smoker     Smokeless tobacco: Never Used   Substance and Sexual Activity     Alcohol use: Yes     Comment: Social     Drug use: No     Sexual activity: Not Currently     Partners: Male   Other Topics Concern     Parent/sibling w/ CABG, MI or angioplasty before 65F 55M? No   Social History Narrative     Not on file       Current Outpatient Medications   Medication Sig Dispense Refill     calcium carbonate (OS-LAURI 500 MG [place]. [place]) 1250 MG tablet Take 1 tablet by mouth 2 times daily       Multiple Vitamin (MULTI VITAMIN PO)          Medications and history reviewed    Physical exam:  Vitals: /87   Pulse 86   Ht 1.6 m (5' 3\")   Wt 53.5 kg (118 lb)   BMI 20.90 kg/m    BMI= Body mass index is 20.9 kg/m .    Labs show:  None new since hospitalization    Imaging shows:  CT ABDOMEN AND PELVIS WITH CONTRAST   12/12/2018 3:44 PM      HISTORY: Abdominal pain, appendicitis suspected. RLQ abdominal pain,  LLQ abdominal pain.     TECHNIQUE: 62 mL Isovue-370. CT images of the abdomen and pelvis  following nonionic intravenous contrast. Radiation dose for this scan  was reduced using automated exposure control, adjustment of the mA  and/or kV according to patient size, or iterative reconstruction  technique.     COMPARISON: None.     FINDINGS: There is a calcified granuloma at the right lung base. There  are low-density liver lesions, largest of which is clearly a cyst. The  others are too small to characterize but statistically likely to be  benign. The gallbladder, spleen, pancreas, and adrenal glands are  unremarkable. There is no hydronephrosis or solid renal mass. The  appendix is not definitely visualized although there is no secondary  evidence of appendicitis.     There is colonic wall thickening associated with " diverticulosis. There  is pericolonic infiltration in the left pelvis. Mixed air collection  measuring 1.6 cm (series 2, image 62) could represent a small  contained perforation. There is no free intraperitoneal air or  ascites. No drainable abscess.     No abdominal or retroperitoneal adenopathy. No enlarged pelvic lymph  nodes. The uterus is absent. Visualized bones are unremarkable.                                                                      IMPRESSION: Acute diverticulitis with possible small, contained  perforation but no drainable abscess or free intraperitoneal air.    Assessment:     ICD-10-CM    1. Diverticulitis of colon K57.32 GASTROENTEROLOGY ADULT REF PROCEDURE ONLY [facility] [phone]; [facility] ([place])     Plan: Discussed diverticulitis and differences in complicated and uncomplicated cases. She may have had small abscess/perforation though large diverticulum is possible from my review of images. I would lean towards small contained abscess though is not clear cut. This is her first episode of diverticulitis. Discussed increasing dietary fiber/supplements and good hydration to help prevent future issues. Will plan on colonoscopy to check area of colon mid Feb. Will need to see what happens with this ongoing pain, whether improves or becomes a chronic diverticulitis type situation. Described what an elective colon resection would entail and answered her questions. Once colonoscopy completed and we see what is going on with the pain symptoms then will follow up and discuss whether we want to pursue resection electively or not.    Time spent with patient 30 min >50% counseling and coordinating care    Shawn Corona MD

## 2019-01-02 NOTE — LETTER
1/2/2019         RE: Meg Beverly  [address]  [place] [address]        Dear Colleague,    Thank you for referring your patient, Meg Beverly, to the [facility]. Please see a copy of my visit note below.    General Surgery Follow Up    Pt returns for follow up visit after hospitalization for diverticulitis    HPI:  Discharged 12/16/18 on oral antibiotics. Antibiotics finished 12/22/18  No fevers  Bowels moving well  Still can get some pain- right lower quadrant area (which is where main pain was before as well). Happens randomly, few times a week.  Otherwise feeling back to normal  This was first episode of diverticulitis  Last colonoscopy 1 year ago- did have adenoma    Review Of Systems    Skin: negative  Ears/Nose/Throat: negative  Respiratory: No shortness of breath, dyspnea on exertion, cough, or hemoptysis  Cardiovascular: negative  Gastrointestinal: as above and history of diarrhea/IBS issues 20+ years  Genitourinary: negative  Musculoskeletal: negative  Neurologic: negative  Hematologic/Lymphatic/Immunologic: negative  Endocrine: negative      No past medical history on file.   Patient Active Problem List   Diagnosis     Mixed hyperlipidemia     Midline cystocele     Functional diarrhea     Age-related osteoporosis without current pathological fracture     Diverticulitis of colon with perforation       Past Surgical History:   Procedure Laterality Date     COLONOSCOPY N/A 1/17/2018    Procedure: COMBINED COLONOSCOPY, SINGLE OR MULTIPLE BIOPSY/POLYPECTOMY BY BIOPSY;;  Surgeon: Curly Laura DO;  Location: [facility] OR     COLONOSCOPY WITH CO2 INSUFFLATION N/A 1/17/2018    Procedure: COLONOSCOPY WITH CO2 INSUFFLATION;  COLON-DIARRHEA,WEIGHT LOSS/ VANDER WAL;  Surgeon: Curly Laura DO;  Location:  OR     GYN SURGERY      D & C     ORTHOPEDIC SURGERY Left 09/2014    meniscus repair       Social History     Socioeconomic History     Marital status:      Spouse name:  "Not on file     Number of children: Not on file     Years of education: Not on file     Highest education level: Not on file   Social Needs     Financial resource strain: Not on file     Food insecurity - worry: Not on file     Food insecurity - inability: Not on file     Transportation needs - medical: Not on file     Transportation needs - non-medical: Not on file   Occupational History     Not on file   Tobacco Use     Smoking status: Never Smoker     Smokeless tobacco: Never Used   Substance and Sexual Activity     Alcohol use: Yes     Comment: Social     Drug use: No     Sexual activity: Not Currently     Partners: Male   Other Topics Concern     Parent/sibling w/ CABG, MI or angioplasty before 65F 55M? No   Social History Narrative     Not on file       Current Outpatient Medications   Medication Sig Dispense Refill     calcium carbonate (OS-LAURI 500 MG [place]. [place]) 1250 MG tablet Take 1 tablet by mouth 2 times daily       Multiple Vitamin (MULTI VITAMIN PO)          Medications and history reviewed    Physical exam:  Vitals: /87   Pulse 86   Ht 1.6 m (5' 3\")   Wt 53.5 kg (118 lb)   BMI 20.90 kg/m     BMI= Body mass index is 20.9 kg/m .    Labs show:  None new since hospitalization    Imaging shows:  CT ABDOMEN AND PELVIS WITH CONTRAST   12/12/2018 3:44 PM      HISTORY: Abdominal pain, appendicitis suspected. RLQ abdominal pain,  LLQ abdominal pain.     TECHNIQUE: 62 mL Isovue-370. CT images of the abdomen and pelvis  following nonionic intravenous contrast. Radiation dose for this scan  was reduced using automated exposure control, adjustment of the mA  and/or kV according to patient size, or iterative reconstruction  technique.     COMPARISON: None.     FINDINGS: There is a calcified granuloma at the right lung base. There  are low-density liver lesions, largest of which is clearly a cyst. The  others are too small to characterize but statistically likely to be  benign. The gallbladder, spleen, " pancreas, and adrenal glands are  unremarkable. There is no hydronephrosis or solid renal mass. The  appendix is not definitely visualized although there is no secondary  evidence of appendicitis.     There is colonic wall thickening associated with diverticulosis. There  is pericolonic infiltration in the left pelvis. Mixed air collection  measuring 1.6 cm (series 2, image 62) could represent a small  contained perforation. There is no free intraperitoneal air or  ascites. No drainable abscess.     No abdominal or retroperitoneal adenopathy. No enlarged pelvic lymph  nodes. The uterus is absent. Visualized bones are unremarkable.                                                                      IMPRESSION: Acute diverticulitis with possible small, contained  perforation but no drainable abscess or free intraperitoneal air.    Assessment:     ICD-10-CM    1. Diverticulitis of colon K57.32 GASTROENTEROLOGY ADULT REF PROCEDURE ONLY [facility] [phone]; [facility] ([facility])     Plan: Discussed diverticulitis and differences in complicated and uncomplicated cases. She may have had small abscess/perforation though large diverticulum is possible from my review of images. I would lean towards small contained abscess though is not clear cut. This is her first episode of diverticulitis. Discussed increasing dietary fiber/supplements and good hydration to help prevent future issues. Will plan on colonoscopy to check area of colon mid Feb. Will need to see what happens with this ongoing pain, whether improves or becomes a chronic diverticulitis type situation. Described what an elective colon resection would entail and answered her questions. Once colonoscopy completed and we see what is going on with the pain symptoms then will follow up and discuss whether we want to pursue resection electively or not.    Time spent with patient 30 min >50% counseling and coordinating care    Shawn Menon  MD Chloe      Again, thank you for allowing me to participate in the care of your patient.        Sincerely,        Shawn Corona MD

## 2019-02-08 ASSESSMENT — MIFFLIN-ST. JEOR: SCORE: 1049.37

## 2019-02-15 ENCOUNTER — HOSPITAL ENCOUNTER (OUTPATIENT)
Facility: AMBULATORY SURGERY CENTER | Age: 64
Discharge: HOME | End: 2019-02-15
Attending: SURGERY | Admitting: SURGERY
Payer: COMMERCIAL

## 2019-02-15 VITALS
RESPIRATION RATE: 16 BRPM | TEMPERATURE: 97.2 F | DIASTOLIC BLOOD PRESSURE: 71 MMHG | OXYGEN SATURATION: 98 % | WEIGHT: 118 LBS | BODY MASS INDEX: 20.91 KG/M2 | HEART RATE: 89 BPM | HEIGHT: 63 IN | SYSTOLIC BLOOD PRESSURE: 108 MMHG

## 2019-02-15 LAB — COLONOSCOPY: NORMAL

## 2019-02-15 PROCEDURE — 45381 COLONOSCOPY SUBMUCOUS NJX: CPT | Mod: PT | Performed by: SURGERY

## 2019-02-15 PROCEDURE — G8918 PT W/O PREOP ORDER IV AB PRO: HCPCS

## 2019-02-15 PROCEDURE — G8907 PT DOC NO EVENTS ON DISCHARG: HCPCS

## 2019-02-15 PROCEDURE — 45378 DIAGNOSTIC COLONOSCOPY: CPT

## 2019-02-15 PROCEDURE — G0500 MOD SEDAT ENDO SERVICE >5YRS: HCPCS | Performed by: SURGERY

## 2019-02-15 RX ORDER — ONDANSETRON 2 MG/ML
4 INJECTION INTRAMUSCULAR; INTRAVENOUS
Status: DISCONTINUED | OUTPATIENT
Start: 2019-02-15 | End: 2019-02-16 | Stop reason: HOSPADM

## 2019-02-15 RX ORDER — FENTANYL CITRATE 50 UG/ML
INJECTION, SOLUTION INTRAMUSCULAR; INTRAVENOUS PRN
Status: DISCONTINUED | OUTPATIENT
Start: 2019-02-15 | End: 2019-02-15 | Stop reason: HOSPADM

## 2019-02-15 RX ORDER — LIDOCAINE 40 MG/G
CREAM TOPICAL
Status: DISCONTINUED | OUTPATIENT
Start: 2019-02-15 | End: 2019-02-16 | Stop reason: HOSPADM

## 2019-03-13 ENCOUNTER — OFFICE VISIT (OUTPATIENT)
Dept: SURGERY | Facility: CLINIC | Age: 64
End: 2019-03-13
Payer: COMMERCIAL

## 2019-03-13 VITALS
HEIGHT: 63 IN | HEART RATE: 101 BPM | DIASTOLIC BLOOD PRESSURE: 77 MMHG | BODY MASS INDEX: 23.04 KG/M2 | SYSTOLIC BLOOD PRESSURE: 148 MMHG | WEIGHT: 130 LBS

## 2019-03-13 DIAGNOSIS — Z87.19 HISTORY OF DIVERTICULITIS: Primary | ICD-10-CM

## 2019-03-13 PROCEDURE — 99214 OFFICE O/P EST MOD 30 MIN: CPT | Mod: 57 | Performed by: SURGERY

## 2019-03-13 RX ORDER — ERYTHROMYCIN 500 MG/1
1000 TABLET, COATED ORAL SEE ADMIN INSTRUCTIONS
Qty: 6 TABLET | Refills: 0 | Status: SHIPPED | OUTPATIENT
Start: 2019-03-13 | End: 2021-07-02

## 2019-03-13 RX ORDER — NEOMYCIN SULFATE 500 MG/1
1000 TABLET ORAL SEE ADMIN INSTRUCTIONS
Qty: 6 TABLET | Refills: 0 | Status: SHIPPED | OUTPATIENT
Start: 2019-03-13 | End: 2021-07-02

## 2019-03-13 ASSESSMENT — MIFFLIN-ST. JEOR: SCORE: 1103.81

## 2019-03-13 NOTE — PROGRESS NOTES
Patient seen to go over colonoscopy, diverticulitis    HPI:  Patient is a 63 year old female  with complaints of ongoing lower abdominal/RLQ pain  Same kind that she has been having since the episode of diverticulitis  Can be sharp and severe at times  No fevers  Other than this persistent pain would be in normal health  Bowels moving normally  Colonoscopy done about 1 month ago  Here to discuss surgical options for her     Review Of Systems    Skin: negative  Ears/Nose/Throat: negative  Respiratory: No shortness of breath, dyspnea on exertion, cough, or hemoptysis  Cardiovascular: negative  Gastrointestinal: as above  Genitourinary: negative  Musculoskeletal: negative  Neurologic: negative  Hematologic/Lymphatic/Immunologic: negative  Endocrine: negative      No past medical history on file.    Past Surgical History:   Procedure Laterality Date     COLONOSCOPY N/A 1/17/2018    Procedure: COMBINED COLONOSCOPY, SINGLE OR MULTIPLE BIOPSY/POLYPECTOMY BY BIOPSY;;  Surgeon: Curly Laura DO;  Location: [facility] OR     COLONOSCOPY WITH CO2 INSUFFLATION N/A 1/17/2018    Procedure: COLONOSCOPY WITH CO2 INSUFFLATION;  COLON-DIARRHEA,WEIGHT LOSS/ VANDER WAL;  Surgeon: Curly Laura DO;  Location: [facility] OR     COLONOSCOPY WITH CO2 INSUFFLATION N/A 2/15/2019    Procedure: COLONOSCOPY WITH CO2 INSUFFLATION;  Surgeon: Shawn Corona MD;  Location: [facility] OR     GYN SURGERY      D & C     ORTHOPEDIC SURGERY Left 09/2014    meniscus repair       Social History     Socioeconomic History     Marital status:      Spouse name: Not on file     Number of children: Not on file     Years of education: Not on file     Highest education level: Not on file   Occupational History     Not on file   Social Needs     Financial resource strain: Not on file     Food insecurity:     Worry: Not on file     Inability: Not on file     Transportation needs:     Medical: Not on file     Non-medical: Not on file   Tobacco Use     Smoking status:  "Never Smoker     Smokeless tobacco: Never Used   Substance and Sexual Activity     Alcohol use: Yes     Comment: Social     Drug use: No     Sexual activity: Not Currently     Partners: Male   Lifestyle     Physical activity:     Days per week: Not on file     Minutes per session: Not on file     Stress: Not on file   Relationships     Social connections:     Talks on phone: Not on file     Gets together: Not on file     Attends Protestant service: Not on file     Active member of club or organization: Not on file     Attends meetings of clubs or organizations: Not on file     Relationship status: Not on file     Intimate partner violence:     Fear of current or ex partner: Not on file     Emotionally abused: Not on file     Physically abused: Not on file     Forced sexual activity: Not on file   Other Topics Concern     Parent/sibling w/ CABG, MI or angioplasty before 65F 55M? No   Social History Narrative     Not on file       Current Outpatient Medications   Medication Sig Dispense Refill     calcium carbonate (OS-LAURI 500 MG [place]. [place]) 1250 MG tablet Take 1 tablet by mouth 2 times daily       Multiple Vitamin (MULTI VITAMIN PO)          Medications and history reviewed    Physical exam:  Vitals: /77   Pulse 101   Ht 1.6 m (5' 3\")   Wt 59 kg (130 lb)   BMI 23.03 kg/m    BMI= Body mass index is 23.03 kg/m .      Imaging shows:  Colonoscopy 2/15 with me  Findings:        The perianal exam findings include non-thrombosed external hemorrhoids.        Multiple small-mouthed diverticula were found from 20 to 30 cm proximal        to the anus. Area of most proximal diverticula in this main area was        tattooed with an injection of 0.5 mL of Spot (carbon black). There were        just a few small (2) proximal to this about 35cm        The exam was otherwise without abnormality on direct and retroflexion        views.     Assessment:     ICD-10-CM    1. History of diverticulitis Z87.19 Hallie-Operative Worksheet "     neomycin (MYCIFRADIN) 500 MG tablet     erythromycin 500 MG TABS     Plan: Discussed options of waiting vs sigmoid resection (planned as lap or robotic). Risks of bleeding, infection, intra-abdominal injury, conversion to open, need for ostomy discussed as well.  I recommend resection given that she has developed a chronic diverticulitis type picture with the persistent pain now about 3 months from the acute attack and hospital stay.  She would like to go ahead with surgery, will plan for a robotic repair  If pain worsening or getting fevers/feeling ill then advised to go to ER in case new acute diverticulitis episode.  Working on scheduling    Time spent with patient face to face 30 min >50% counseling and coordinating care    Shawn Corona MD

## 2019-03-13 NOTE — LETTER
3/13/2019         RE: Meg Beverly  [address]  [place] [address]        Dear Colleague,    Thank you for referring your patient, Meg Beverly, to the [facility]. Please see a copy of my visit note below.    Patient seen to go over colonoscopy, diverticulitis    HPI:  Patient is a 63 year old female  with complaints of ongoing lower abdominal/RLQ pain  Same kind that she has been having since the episode of diverticulitis  Can be sharp and severe at times  No fevers  Other than this persistent pain would be in normal health  Bowels moving normally  Colonoscopy done about 1 month ago  Here to discuss surgical options for her     Review Of Systems    Skin: negative  Ears/Nose/Throat: negative  Respiratory: No shortness of breath, dyspnea on exertion, cough, or hemoptysis  Cardiovascular: negative  Gastrointestinal: as above  Genitourinary: negative  Musculoskeletal: negative  Neurologic: negative  Hematologic/Lymphatic/Immunologic: negative  Endocrine: negative      No past medical history on file.    Past Surgical History:   Procedure Laterality Date     COLONOSCOPY N/A 1/17/2018    Procedure: COMBINED COLONOSCOPY, SINGLE OR MULTIPLE BIOPSY/POLYPECTOMY BY BIOPSY;;  Surgeon: Curly Laura DO;  Location: [facility] OR     COLONOSCOPY WITH CO2 INSUFFLATION N/A 1/17/2018    Procedure: COLONOSCOPY WITH CO2 INSUFFLATION;  COLON-DIARRHEA,WEIGHT LOSS/ VANDER WAL;  Surgeon: Curly Laura DO;  Location: [facility] OR     COLONOSCOPY WITH CO2 INSUFFLATION N/A 2/15/2019    Procedure: COLONOSCOPY WITH CO2 INSUFFLATION;  Surgeon: Shawn Corona MD;  Location:  OR     GYN SURGERY      D & C     ORTHOPEDIC SURGERY Left 09/2014    meniscus repair       Social History     Socioeconomic History     Marital status:      Spouse name: Not on file     Number of children: Not on file     Years of education: Not on file     Highest education level: Not on file   Occupational History     Not on file  "  Social Needs     Financial resource strain: Not on file     Food insecurity:     Worry: Not on file     Inability: Not on file     Transportation needs:     Medical: Not on file     Non-medical: Not on file   Tobacco Use     Smoking status: Never Smoker     Smokeless tobacco: Never Used   Substance and Sexual Activity     Alcohol use: Yes     Comment: Social     Drug use: No     Sexual activity: Not Currently     Partners: Male   Lifestyle     Physical activity:     Days per week: Not on file     Minutes per session: Not on file     Stress: Not on file   Relationships     Social connections:     Talks on phone: Not on file     Gets together: Not on file     Attends Baptism service: Not on file     Active member of club or organization: Not on file     Attends meetings of clubs or organizations: Not on file     Relationship status: Not on file     Intimate partner violence:     Fear of current or ex partner: Not on file     Emotionally abused: Not on file     Physically abused: Not on file     Forced sexual activity: Not on file   Other Topics Concern     Parent/sibling w/ CABG, MI or angioplasty before 65F 55M? No   Social History Narrative     Not on file       Current Outpatient Medications   Medication Sig Dispense Refill     calcium carbonate (OS-LAURI 500 MG [place]. [place]) 1250 MG tablet Take 1 tablet by mouth 2 times daily       Multiple Vitamin (MULTI VITAMIN PO)          Medications and history reviewed    Physical exam:  Vitals: /77   Pulse 101   Ht 1.6 m (5' 3\")   Wt 59 kg (130 lb)   BMI 23.03 kg/m     BMI= Body mass index is 23.03 kg/m .      Imaging shows:  Colonoscopy 2/15 with me  Findings:        The perianal exam findings include non-thrombosed external hemorrhoids.        Multiple small-mouthed diverticula were found from 20 to 30 cm proximal        to the anus. Area of most proximal diverticula in this main area was        tattooed with an injection of 0.5 mL of Spot (carbon black). There " were        just a few small (2) proximal to this about 35cm        The exam was otherwise without abnormality on direct and retroflexion        views.     Assessment:     ICD-10-CM    1. History of diverticulitis Z87.19 Hallie-Operative Worksheet     neomycin (MYCIFRADIN) 500 MG tablet     erythromycin 500 MG TABS     Plan: Discussed options of waiting vs sigmoid resection (planned as lap or robotic). Risks of bleeding, infection, intra-abdominal injury, conversion to open, need for ostomy discussed as well.  I recommend resection given that she has developed a chronic diverticulitis type picture with the persistent pain now about 3 months from the acute attack and hospital stay.  She would like to go ahead with surgery, will plan for a robotic repair  If pain worsening or getting fevers/feeling ill then advised to go to ER in case new acute diverticulitis episode.  Working on scheduling    Time spent with patient face to face 30 min >50% counseling and coordinating care    Shawn Corona MD      Again, thank you for allowing me to participate in the care of your patient.        Sincerely,        Shawn Corona MD

## 2019-03-14 ENCOUNTER — TELEPHONE (OUTPATIENT)
Dept: SURGERY | Facility: CLINIC | Age: 64
End: 2019-03-14

## 2019-03-14 NOTE — TELEPHONE ENCOUNTER
Type of surgery: robotic assisted laparoscopic sigmoid colectomy with anastomosis, possible open, possible ostomy CPT code 42283 and possible 46175  History of diverticulitis [Z87.19]  - Primary   Location of surgery: [facility]  Date and time of surgery: 4-11-19  Surgeon: Dr Corona with Dr Richmond to assist  Pre-Op Appt Date: 3-28-19  Post-Op Appt Date: 4-29-19   Packet sent out: Yes  Pre-cert/Authorization completed: No prior auth required per [employer] list.   Date: 03/14/2019    Sent to  and was received.   Insurance valid.

## 2019-03-15 ENCOUNTER — TELEPHONE (OUTPATIENT)
Dept: FAMILY MEDICINE | Facility: CLINIC | Age: 64
End: 2019-03-15

## 2019-03-15 NOTE — TELEPHONE ENCOUNTER
Shawn Corona MD Jones, Jennifer Eileen, RN   Caller: Unspecified (Today, 10:23 AM)             Should be fine     Shawn

## 2019-03-28 ENCOUNTER — OFFICE VISIT (OUTPATIENT)
Dept: FAMILY MEDICINE | Facility: CLINIC | Age: 64
End: 2019-03-28
Payer: COMMERCIAL

## 2019-03-28 VITALS
SYSTOLIC BLOOD PRESSURE: 124 MMHG | HEART RATE: 98 BPM | HEIGHT: 63 IN | DIASTOLIC BLOOD PRESSURE: 72 MMHG | TEMPERATURE: 97.7 F | WEIGHT: 127 LBS | RESPIRATION RATE: 20 BRPM | BODY MASS INDEX: 22.5 KG/M2 | OXYGEN SATURATION: 100 %

## 2019-03-28 DIAGNOSIS — Z12.31 VISIT FOR SCREENING MAMMOGRAM: ICD-10-CM

## 2019-03-28 DIAGNOSIS — Z01.818 PREOP GENERAL PHYSICAL EXAM: ICD-10-CM

## 2019-03-28 PROCEDURE — 93000 ELECTROCARDIOGRAM COMPLETE: CPT | Performed by: NURSE PRACTITIONER

## 2019-03-28 PROCEDURE — 99214 OFFICE O/P EST MOD 30 MIN: CPT | Performed by: NURSE PRACTITIONER

## 2019-03-28 ASSESSMENT — MIFFLIN-ST. JEOR: SCORE: 1090.2

## 2019-03-28 ASSESSMENT — PAIN SCALES - GENERAL: PAINLEVEL: NO PAIN (0)

## 2019-03-28 NOTE — PATIENT INSTRUCTIONS
Okay to take antibiotics with small sips of medication the morning of surgery.  Stop aspirin, ibuprofen, aleve, fish oil 7 days before surgery.   Tylenol is okay for pain.  Schedule mammogram at your convenience.  Mammogram records are in [facility].    Before Your Surgery      Call your surgeon if there is any change in your health. This includes signs of a cold or flu (such as a sore throat, runny nose, cough, rash or fever).    Do not smoke, drink alcohol or take over the counter medicine (unless your surgeon or primary care doctor tells you to) for the 24 hours before and after surgery.    If you take prescribed drugs: Follow your doctor s orders about which medicines to take and which to stop until after surgery.    Eating and drinking prior to surgery: follow the instructions from your surgeon    Take a shower or bath the night before surgery. Use the soap your surgeon gave you to gently clean your skin. If you do not have soap from your surgeon, use your regular soap. Do not shave or scrub the surgery site.  Wear clean pajamas and have clean sheets on your bed.

## 2019-03-28 NOTE — PROGRESS NOTES
[facility]  [address] [place] [id number]  [phone]  Dept: [phone]    PRE-OP EVALUATION:  Today's date: 3/28/2019    Meg Beverly (: 1955) presents for pre-operative evaluation assessment as requested by Dr. Corona.  She requires evaluation and anesthesia risk assessment prior to undergoing surgery/procedure for treatment of chronic diverticulitis  Fax number for surgical facility:   Primary Physician: Dara Norris  Type of Anesthesia Anticipated: General    Patient has a Health Care Directive or Living Will:  NO    Preop Questions 3/26/2019   Who is doing your surgery? Shawn Corona MD   What are you having done? Robotic SI Assisted Laparoscopic Sigmoid Colectomy with Anastomosis Possible Ostomy   Date of Surgery/Procedure: 2019   Facility or Hospital where procedure/surgery will be performed: [facility]   1.  Do you have a history of Heart attack, stroke, stent, coronary bypass surgery, or other heart surgery? No   2.  Do you ever have any pain or discomfort in your chest? No   3.  Do you have a history of  Heart Failure? No   4.   Are you troubled by shortness of breath when:  walking on a level surface, or up a slight hill, or at night? No   5.  Do you currently have a cold, bronchitis or other respiratory infection? No   6.  Do you have a cough, shortness of breath, or wheezing? No   7.  Do you sometimes get pains in the calves of your legs when you walk? No   8. Do you or anyone in your family have previous history of blood clots? No   9.  Do you or does anyone in your family have a serious bleeding problem such as prolonged bleeding following surgeries or cuts? No   10. Have you ever had problems with anemia or been told to take iron pills? No   11. Have you had any abnormal blood loss such as black, tarry or bloody stools, or abnormal vaginal bleeding? No   12. Have you ever had a blood transfusion? YES- after miscarriage  and D and C    13. Have you or any of your relatives ever had problems with anesthesia? No   14. Do you have sleep apnea, excessive snoring or daytime drowsiness? No   15. Do you have any prosthetic heart valves? No   16. Do you have prosthetic joints? No   17. Is there any chance that you may be pregnant? No     Dara Norris CNP       HPI:     HPI related to upcoming procedure: Patient suffered an acute case of diverticulitis in 12/18.  She continues to have pain and will undergo partial colectomy to help relieve pain.      See problem list for active medical problems.  Problems all longstanding and stable, except as noted/documented.  See ROS for pertinent symptoms related to these conditions.                                                                                                                                                          .    MEDICAL HISTORY:     Patient Active Problem List    Diagnosis Date Noted     Diverticulitis of colon with perforation 12/12/2018     Priority: Medium     Hospitalized at [facility] 12/12/18-12/14/18       Age-related osteoporosis without current pathological fracture 08/03/2018     Priority: Medium     Functional diarrhea 06/06/2018     Priority: Medium     Mixed hyperlipidemia 01/02/2018     Priority: Medium     Midline cystocele 01/02/2018     Priority: Medium      History reviewed. No pertinent past medical history.  Past Surgical History:   Procedure Laterality Date     COLONOSCOPY N/A 1/17/2018    Procedure: COMBINED COLONOSCOPY, SINGLE OR MULTIPLE BIOPSY/POLYPECTOMY BY BIOPSY;;  Surgeon: Curly Laura DO;  Location: [facility] OR     COLONOSCOPY WITH CO2 INSUFFLATION N/A 1/17/2018    Procedure: COLONOSCOPY WITH CO2 INSUFFLATION;  COLON-DIARRHEA,WEIGHT LOSS/ NAVEED VASQUEZ;  Surgeon: Curly Laura DO;  Location: [facility] OR     COLONOSCOPY WITH CO2 INSUFFLATION N/A 2/15/2019    Procedure: COLONOSCOPY WITH CO2 INSUFFLATION;  Surgeon: Shawn Corona MD;  " Location: [facility] OR     DILATION AND CURETTAGE  1977     GYN SURGERY      D & C     ORTHOPEDIC SURGERY Left 09/2014    meniscus repair     Current Outpatient Medications   Medication Sig Dispense Refill     calcium carbonate (OS-LAURI 500 MG [place]. [place]) 1250 MG tablet Take 1 tablet by mouth 2 times daily       erythromycin 500 MG TABS Take 2 tablets (1,000 mg) by mouth See Admin Instructions for 3 doses 6 tablet 0     Multiple Vitamin (MULTI VITAMIN PO)        neomycin (MYCIFRADIN) 500 MG tablet Take 2 tablets (1,000 mg) by mouth See Admin Instructions for 3 doses 6 tablet 0     OTC products: herbals and vitamins - melatonin    Allergies   Allergen Reactions     Fosamax [Alendronic Acid]      mild tingling and swelling of the lips, itchiness/tingling in my nose, and overall itchiness of the scalp, neck, and ears, along with a mild feeling of being short of breath     Latex Rash      Latex Allergy: YES: Precautions to take: avoid    Social History     Tobacco Use     Smoking status: Never Smoker     Smokeless tobacco: Never Used   Substance Use Topics     Alcohol use: Yes     Comment: Social     History   Drug Use No       REVIEW OF SYSTEMS:   Constitutional, neuro, ENT, endocrine, pulmonary, cardiac, gastrointestinal, genitourinary, musculoskeletal, integument and psychiatric systems are negative, except as otherwise noted.    EXAM:   /72   Pulse 98   Temp 97.7  F (36.5  C) (Oral)   Resp 20   Ht 1.6 m (5' 3\")   Wt 57.6 kg (127 lb)   SpO2 100%   BMI 22.50 kg/m      GENERAL APPEARANCE: healthy, alert and no distress     EYES: EOMI, PERRL     HENT: ear canals and TM's normal and nose and mouth without ulcers or lesions     NECK: no adenopathy, no asymmetry, masses, or scars and thyroid normal to palpation     RESP: lungs clear to auscultation - no rales, rhonchi or wheezes     CV: regular rates and rhythm, normal S1 S2, no S3 or S4 and no murmur, click or rub     ABDOMEN:  soft, nontender, no HSM or masses and " bowel sounds normal     MS: extremities normal- no gross deformities noted, no evidence of inflammation in joints, FROM in all extremities.     SKIN: no suspicious lesions or rashes     NEURO: Normal strength and tone, sensory exam grossly normal, mentation intact and speech normal     PSYCH: mentation appears normal. and affect normal/bright     LYMPHATICS: No cervical adenopathy    DIAGNOSTICS:   EKG: appears normal, NSR, normal axis, normal intervals, no acute ST/T changes c/w ischemia, no LVH by voltage criteria, unchanged from previous tracings    HGB 13.8 3/19/19 at [facility]   Lab Test 12/16/18 12/12/18  1140 01/02/18  1137   HGB  --  12.9 13.4   PLT  --  296 353   NA  --   --  138   POTASSIUM 3.8  --  3.9   CR 0.91  --  0.82        IMPRESSION:   Reason for surgery/procedure: Diverticulitis  Diagnosis/reason for consult: Management of comorbid conditions and preoperative exam.      The proposed surgical procedure is considered INTERMEDIATE risk.    REVISED CARDIAC RISK INDEX  The patient has the following serious cardiovascular risks for perioperative complications such as (MI, PE, VFib and 3  AV Block):  No serious cardiac risks  INTERPRETATION: 0 risks: Class I (very low risk - 0.4% complication rate)    The patient has the following additional risks for perioperative complications:  No identified additional risks      ICD-10-CM    1. Preop general physical exam Z01.818 EKG 12-lead complete w/read - Clinics   2. Visit for screening mammogram Z12.31      Patient encouraged to schedule mammogram.    RECOMMENDATIONS:       Cardiovascular Risk  Performs 4 METs exercise without symptoms (Light housework (dusting, washing dishes) and Climb a flight of stairs) .       --Patient is to take all scheduled medications on the day of surgery EXCEPT for modifications listed below.    APPROVAL GIVEN to proceed with proposed procedure, without further diagnostic evaluation       Signed Electronically  by: JACQUELIN Muhammad CNP    Copy of this evaluation report is provided to requesting physician.    [place] Preop Guidelines    Revised Cardiac Risk Index

## 2019-04-08 ENCOUNTER — TRANSFERRED RECORDS (OUTPATIENT)
Dept: HEALTH INFORMATION MANAGEMENT | Facility: CLINIC | Age: 64
End: 2019-04-08

## 2019-04-11 ENCOUNTER — TRANSFERRED RECORDS (OUTPATIENT)
Dept: HEALTH INFORMATION MANAGEMENT | Facility: CLINIC | Age: 64
End: 2019-04-11

## 2019-04-13 LAB
CREAT SERPL-MCNC: 0.89 MG/DL (ref 0.55–1.02)
GFR SERPL CREATININE-BSD FRML MDRD: >60 ML/MIN
GLUCOSE SERPL-MCNC: 86 MG/DL (ref 70–110)
POTASSIUM SERPL-SCNC: 4 MMOL/L (ref 3.5–5.1)

## 2019-04-17 ENCOUNTER — TELEPHONE (OUTPATIENT)
Dept: INTERNAL MEDICINE | Facility: CLINIC | Age: 64
End: 2019-04-17

## 2019-04-29 ENCOUNTER — OFFICE VISIT (OUTPATIENT)
Dept: SURGERY | Facility: CLINIC | Age: 64
End: 2019-04-29
Payer: COMMERCIAL

## 2019-04-29 VITALS
DIASTOLIC BLOOD PRESSURE: 75 MMHG | HEART RATE: 94 BPM | SYSTOLIC BLOOD PRESSURE: 145 MMHG | WEIGHT: 127 LBS | HEIGHT: 63 IN | BODY MASS INDEX: 22.5 KG/M2

## 2019-04-29 DIAGNOSIS — Z90.49 S/P PARTIAL RESECTION OF COLON: Primary | ICD-10-CM

## 2019-04-29 PROCEDURE — 99024 POSTOP FOLLOW-UP VISIT: CPT | Performed by: SURGERY

## 2019-04-29 ASSESSMENT — MIFFLIN-ST. JEOR: SCORE: 1090.2

## 2019-04-29 NOTE — PROGRESS NOTES
"General Surgery Post Op    Pt returns for follow up visit s/p robotic sigmoid colectomy on 4/11/19.    Patient has been doing well, tolerating diet. Bowels moving well. Pain controlled- used all the narcotics she had been prescribed. Now using occasional tylenol. No issues with wound healing/redness/drainage. No fevers. Can get random mild pains in lower abdomen still, can be different locations and always has gone away after short time.      Physical exam: Vitals: /75   Pulse 94   Ht 1.6 m (5' 3\")   Wt 57.6 kg (127 lb)   BMI 22.50 kg/m    BMI= Body mass index is 22.5 kg/m .    Exam:  Constitutional: healthy, alert and no distress  Cardiovascular: negative, PMI normal. No lifts, heaves, or thrills. RRR. No murmurs, clicks gallops or rub  Respiratory: negative, Percussion normal. Good diaphragmatic excursion. Lungs clear  Gastrointestinal: Abdomen soft, non-tender. BS normal. No masses, organomegaly  Incisions healing well no infection    Path:  Final Diagnosis Sigmoid colon, resection - Diverticulosis with diverticulitis. Negative for malignancy.         Assessment:     ICD-10-CM    1. S/P partial resection of colon Z90.49      Plan: Doing well. Advised high fiber foods and good hydration to prevent future diverticular issues. Next colonoscopy can be 5 years due to history of polyps. Keep lifting lighter for few weeks but activities can be as tolerated. Follow up with me as needed.    Shawn Corona MD      "

## 2019-04-29 NOTE — LETTER
"    4/29/2019         RE: Meg Beverly  [address] [address]        Dear Colleague,    Thank you for referring your patient, Meg Beverly, to the [facility]. Please see a copy of my visit note below.    General Surgery Post Op    Pt returns for follow up visit s/p robotic sigmoid colectomy on 4/11/19.    Patient has been doing well, tolerating diet. Bowels moving well. Pain controlled- used all the narcotics she had been prescribed. Now using occasional tylenol. No issues with wound healing/redness/drainage. No fevers. Can get random mild pains in lower abdomen still, can be different locations and always has gone away after short time.      Physical exam: Vitals: /75   Pulse 94   Ht 1.6 m (5' 3\")   Wt 57.6 kg (127 lb)   BMI 22.50 kg/m     BMI= Body mass index is 22.5 kg/m .    Exam:  Constitutional: healthy, alert and no distress  Cardiovascular: negative, PMI normal. No lifts, heaves, or thrills. RRR. No murmurs, clicks gallops or rub  Respiratory: negative, Percussion normal. Good diaphragmatic excursion. Lungs clear  Gastrointestinal: Abdomen soft, non-tender. BS normal. No masses, organomegaly  Incisions healing well no infection    Path:  Final Diagnosis Sigmoid colon, resection - Diverticulosis with diverticulitis. Negative for malignancy.         Assessment:     ICD-10-CM    1. S/P partial resection of colon Z90.49      Plan: Doing well. Advised high fiber foods and good hydration to prevent future diverticular issues. Next colonoscopy can be 5 years due to history of polyps. Keep lifting lighter for few weeks but activities can be as tolerated. Follow up with me as needed.    Shawn Corona MD        Again, thank you for allowing me to participate in the care of your patient.        Sincerely,        Shawn Corona MD    "

## 2019-05-06 ENCOUNTER — ANCILLARY PROCEDURE (OUTPATIENT)
Dept: MAMMOGRAPHY | Facility: CLINIC | Age: 64
End: 2019-05-06
Attending: NURSE PRACTITIONER
Payer: COMMERCIAL

## 2019-05-06 DIAGNOSIS — Z12.31 ENCOUNTER FOR SCREENING MAMMOGRAM FOR BREAST CANCER: ICD-10-CM

## 2019-05-06 PROCEDURE — 77067 SCR MAMMO BI INCL CAD: CPT | Mod: TC | Performed by: FAMILY MEDICINE

## 2019-05-26 NOTE — MR AVS SNAPSHOT
After Visit Summary   1/2/2018    Meg Beverly    MRN: [id number]           Patient Information     Date Of Birth          1955        Visit Information        Provider Department      1/2/2018 10:40 AM Dara Norris APRN [facility]        Today's Diagnoses     Diarrhea, unspecified type    -  1    Loss of weight          Care Instructions    [facility]    If you have any questions regarding to your visit please contact your care team:     Team Pink:   Clinic Hours Telephone Number   Internal Medicine:  Dr. Esther Norris, NP       7am-7pm  Monday - Thursday   7am-5pm  Fridays  [phone]  (Appointment scheduling available 24/7)    Questions about your visit?  Team Line  [phone]   Urgent Care - [place] and [place] - 11am-9pm Monday-Friday Saturday-Sunday- 9am-5pm   [place] - 5pm-9pm Monday-Friday Saturday-Sunday- 9am-5pm  [phone] - [place]   [phone] - [place]       What options do I have for visits at the clinic other than the traditional office visit?  To expand how we care for you, many of our providers are utilizing electronic visits (e-visits) and telephone visits, when medically appropriate, for interactions with their patients rather than a visit in the clinic.   We also offer nurse visits for many medical concerns. Just like any other service, we will bill your insurance company for this type of visit based on time spent on the phone with your provider. Not all insurance companies cover these visits. Please check with your medical insurance if this type of visit is covered. You will be responsible for any charges that are not paid by your insurance.      E-visits via [employer]:  generally incur a $35.00 fee.  Telephone visits:  Time spent on the phone: *charged based on time that is spent on the phone in increments of 10 minutes. Estimated cost:   5-10 mins $30.00  VSS except soft BPs, held hydralazine. Minimal pain, prn oxycodone given x1. Incisions intact with steri strips. BS active, passing flatus. BMx1 overnight. Voiding per urinal. LS diminished. IVFs infusing. Assist of 1 with walker. IV bleeding/leaking, new IV started. Dental soft diet. Foam dressings on back and coccyx CDI. Lower extremities +2 edema, elevated on pillows.   "  11-20 mins. $59.00   21-30 mins. $85.00   Use [employer] (secure email communication and access to your chart) to send your primary care provider a message or make an appointment. Ask someone on your Team how to sign up for atCollabt.    For a Price Quote for your services, please call our [employer] Price Line at [phone].    As always, Thank you for trusting us with your health care needs!    Day RAMIREZ MA            Follow-ups after your visit        Future tests that were ordered for you today     Open Future Orders        Priority Expected Expires Ordered    Clostridium difficile toxin B PCR Routine 1/2/2018 1/3/2019 1/2/2018    Enteric Bacteria and Virus Panel by MILIND Stool Routine  1/2/2019 1/2/2018    Ova and Parasite Exam Routine Routine  1/2/2019 1/2/2018            Who to contact     If you have questions or need follow up information about today's clinic visit or your schedule please contact [facility] directly at [phone].  Normal or non-critical lab and imaging results will be communicated to you by RadioFramehart, letter or phone within 4 business days after the clinic has received the results. If you do not hear from us within 7 days, please contact the clinic through Radio[employer] or phone. If you have a critical or abnormal lab result, we will notify you by phone as soon as possible.  Submit refill requests through [employer] or call your pharmacy and they will forward the refill request to us. Please allow 3 business days for your refill to be completed.          Additional Information About Your Visit        [employer] Information     [employer] lets you send messages to your doctor, view your test results, renew your prescriptions, schedule appointments and more. To sign up, go to [email or web address] . Click on \"Log in\" on the left side of the screen, which will take you to the Welcome page. Then click on \"Sign up Now\" on the right side of the page.     You will be asked to enter the access code " "listed below, as well as some personal information. Please follow the directions to create your username and password.     Your access code is: [email or web address]  Expires: 2018 11:29 AM     Your access code will  in 90 days. If you need help or a new code, please call your [facility] or [phone].        Care EveryWhere ID     This is your Care EveryWhere ID. This could be used by other organizations to access your [facility] medical records  [id number]        Your Vitals Were     Pulse Temperature Respirations Height Pulse Oximetry BMI (Body Mass Index)    84 98.1  F (36.7  C) (Oral) 16 5' 2.75\" (1.594 m) 99% 23.75 kg/m2       Blood Pressure from Last 3 Encounters:   18 130/84    Weight from Last 3 Encounters:   18 133 lb (60.3 kg)              We Performed the Following     CBC with platelets differential     Comprehensive metabolic panel     TSH with free T4 reflex          Today's Medication Changes          These changes are accurate as of: 18 11:29 AM.  If you have any questions, ask your nurse or doctor.               Start taking these medicines.        Dose/Directions    diphenoxylate-atropine 2.5-0.025 MG per tablet   Commonly known as:  LOMOTIL   Used for:  Diarrhea, unspecified type   Started by:  Dara Norris APRN CNP        Dose:  1-2 tablet   Take 1-2 tablets by mouth 4 times daily as needed for diarrhea   Quantity:  30 tablet   Refills:  1            Where to get your medicines      Some of these will need a paper prescription and others can be bought over the counter.  Ask your nurse if you have questions.     Bring a paper prescription for each of these medications     diphenoxylate-atropine 2.5-0.025 MG per tablet                Primary Care Provider Office Phone # Fax #    [facility] [phone] [address] [address]        Equal Access to Services     KIARA BADILLO AH: oniel Braxton " jade knighteunlewis mccordalexandro conrad chrisin hayaan adeeg kharash la'aan ah. So [facility] [phone].    ATENCIÓN: Si ramon black, tiene a barajas disposición servicios gratuitos de asistencia lingüística. Jessica al [phone].    We comply with applicable federal civil rights laws and [place] laws. We do not discriminate on the basis of race, color, national origin, age, disability, sex, sexual orientation, or gender identity.            Thank you!     Thank you for choosing [facility]  for your care. Our goal is always to provide you with excellent care. Hearing back from our patients is one way we can continue to improve our services. Please take a few minutes to complete the written survey that you may receive in the mail after your visit with us. Thank you!             Your Updated Medication List - Protect others around you: Learn how to safely use, store and throw away your medicines at www.disposemymeds.org.          This list is accurate as of: 1/2/18 11:29 AM.  Always use your most recent med list.                   Brand Name Dispense Instructions for use Diagnosis    calcium carbonate 1250 MG tablet    OS-LAURI 500 mg [place]. [place]     Take 1 tablet by mouth 2 times daily        diphenoxylate-atropine 2.5-0.025 MG per tablet    LOMOTIL    30 tablet    Take 1-2 tablets by mouth 4 times daily as needed for diarrhea    Diarrhea, unspecified type       MULTI VITAMIN PO

## 2019-10-07 ENCOUNTER — ALLIED HEALTH/NURSE VISIT (OUTPATIENT)
Dept: NURSING | Facility: CLINIC | Age: 64
End: 2019-10-07
Payer: COMMERCIAL

## 2019-10-07 DIAGNOSIS — Z23 NEED FOR SHINGLES VACCINE: Primary | ICD-10-CM

## 2019-10-07 PROCEDURE — 90471 IMMUNIZATION ADMIN: CPT

## 2019-10-07 PROCEDURE — 90750 HZV VACC RECOMBINANT IM: CPT

## 2019-10-07 NOTE — NURSING NOTE
Clinic Administered Medication Documentation    MEDICATION LIST:   Injectable Medication Documentation    Patient was given Shingrix. Prior to medication administration, verified patients identity using patient s name and date of birth. Please see MAR and medication order for additional information. Patient instructed to remain in clinic for 15 minutes and report any adverse reaction to staff immediately .      Was entire vial of medication used? Yes  Vial/Syringe: Single dose vial  Expiration Date:  09/25/21  Was this medication supplied by the patient? No

## 2019-10-17 ENCOUNTER — MYC MEDICAL ADVICE (OUTPATIENT)
Dept: FAMILY MEDICINE | Facility: CLINIC | Age: 64
End: 2019-10-17

## 2019-12-18 ENCOUNTER — ALLIED HEALTH/NURSE VISIT (OUTPATIENT)
Dept: NURSING | Facility: CLINIC | Age: 64
End: 2019-12-18
Payer: COMMERCIAL

## 2019-12-18 DIAGNOSIS — Z23 NEED FOR VACCINATION: Primary | ICD-10-CM

## 2019-12-18 PROCEDURE — G0009 ADMIN PNEUMOCOCCAL VACCINE: HCPCS

## 2019-12-18 PROCEDURE — 90732 PPSV23 VACC 2 YRS+ SUBQ/IM: CPT

## 2019-12-18 PROCEDURE — 99207 ZZC NO CHARGE NURSE ONLY: CPT

## 2020-01-22 ENCOUNTER — ALLIED HEALTH/NURSE VISIT (OUTPATIENT)
Dept: NURSING | Facility: CLINIC | Age: 65
End: 2020-01-22
Payer: COMMERCIAL

## 2020-01-22 DIAGNOSIS — Z23 NEED FOR VACCINATION: Primary | ICD-10-CM

## 2020-01-22 PROCEDURE — 90471 IMMUNIZATION ADMIN: CPT

## 2020-01-22 PROCEDURE — 90750 HZV VACC RECOMBINANT IM: CPT

## 2020-01-22 NOTE — PROGRESS NOTES
Prior to immunization administration, verified patients identity using patient s name and date of birth. Please see Immunization Activity for additional information.     Screening Questionnaire for Adult Immunization    Are you sick today?   No   Do you have allergies to medications, food, a vaccine component or latex?   No   Have you ever had a serious reaction after receiving a vaccination?   No   Do you have a long-term health problem with heart, lung, kidney, or metabolic disease (e.g., diabetes), asthma, a blood disorder, no spleen, complement component deficiency, a cochlear implant, or a spinal fluid leak?  Are you on long-term aspirin therapy?   No   Do you have cancer, leukemia, HIV/AIDS, or any other immune system problem?   No   Do you have a parent, brother, or sister with an immune system problem?   No   In the past 3 months, have you taken medications that affect  your immune system, such as prednisone, other steroids, or anticancer drugs; drugs for the treatment of rheumatoid arthritis, Crohn s disease, or psoriasis; or have you had radiation treatments?   No   Have you had a seizure, or a brain or other nervous system problem?   No   During the past year, have you received a transfusion of blood or blood    products, or been given immune (gamma) globulin or antiviral drug?   No   For women: Are you pregnant or is there a chance you could become       pregnant during the next month?   No   Have you received any vaccinations in the past 4 weeks?   No     Immunization questionnaire answers were all negative.               Screening performed by BRAN OVALLE MA on 1/22/2020 at 10:36 AM.

## 2020-02-24 ENCOUNTER — HEALTH MAINTENANCE LETTER (OUTPATIENT)
Age: 65
End: 2020-02-24

## 2021-04-17 ENCOUNTER — HEALTH MAINTENANCE LETTER (OUTPATIENT)
Age: 66
End: 2021-04-17

## 2021-07-02 ENCOUNTER — OFFICE VISIT (OUTPATIENT)
Dept: FAMILY MEDICINE | Facility: CLINIC | Age: 66
End: 2021-07-02
Payer: COMMERCIAL

## 2021-07-02 VITALS
SYSTOLIC BLOOD PRESSURE: 132 MMHG | BODY MASS INDEX: 23.46 KG/M2 | HEIGHT: 63 IN | DIASTOLIC BLOOD PRESSURE: 80 MMHG | TEMPERATURE: 97.7 F | HEART RATE: 80 BPM | OXYGEN SATURATION: 100 % | WEIGHT: 132.4 LBS

## 2021-07-02 DIAGNOSIS — Z00.00 ENCOUNTER FOR MEDICARE ANNUAL WELLNESS EXAM: Primary | ICD-10-CM

## 2021-07-02 DIAGNOSIS — Z12.31 ENCOUNTER FOR SCREENING MAMMOGRAM FOR BREAST CANCER: ICD-10-CM

## 2021-07-02 DIAGNOSIS — Z78.0 ASYMPTOMATIC POSTMENOPAUSAL STATUS: ICD-10-CM

## 2021-07-02 DIAGNOSIS — E78.2 MIXED HYPERLIPIDEMIA: ICD-10-CM

## 2021-07-02 PROCEDURE — G0438 PPPS, INITIAL VISIT: HCPCS | Performed by: NURSE PRACTITIONER

## 2021-07-02 PROCEDURE — 90471 IMMUNIZATION ADMIN: CPT | Performed by: NURSE PRACTITIONER

## 2021-07-02 PROCEDURE — 90714 TD VACC NO PRESV 7 YRS+ IM: CPT | Performed by: NURSE PRACTITIONER

## 2021-07-02 ASSESSMENT — ENCOUNTER SYMPTOMS
SHORTNESS OF BREATH: 0
SORE THROAT: 0
PALPITATIONS: 0
PARESTHESIAS: 0
NAUSEA: 0
ARTHRALGIAS: 0
ABDOMINAL PAIN: 0
DIZZINESS: 0
CHILLS: 0
MYALGIAS: 0
DYSURIA: 0
EYE PAIN: 0
HEMATOCHEZIA: 0
HEARTBURN: 0
HEADACHES: 0
JOINT SWELLING: 0
NERVOUS/ANXIOUS: 0
WEAKNESS: 0
HEMATURIA: 0
FREQUENCY: 0
CONSTIPATION: 0
BREAST MASS: 0
FEVER: 0
COUGH: 0
DIARRHEA: 0

## 2021-07-02 ASSESSMENT — MIFFLIN-ST. JEOR: SCORE: 1100.81

## 2021-07-02 ASSESSMENT — PAIN SCALES - GENERAL: PAINLEVEL: NO PAIN (0)

## 2021-07-02 ASSESSMENT — ACTIVITIES OF DAILY LIVING (ADL): CURRENT_FUNCTION: NO ASSISTANCE NEEDED

## 2021-07-02 NOTE — PROGRESS NOTES
"SUBJECTIVE:   Meg Beverly is a 65 year old female who presents for Preventive Visit.      Patient has been advised of split billing requirements and indicates understanding: Yes   Are you in the first 12 months of your Medicare coverage?  No    Healthy Habits:     In general, how would you rate your overall health?  Excellent    Frequency of exercise:  2-3 days/week    Duration of exercise:  15-30 minutes    Do you usually eat at least 4 servings of fruit and vegetables a day, include whole grains    & fiber and avoid regularly eating high fat or \"junk\" foods?  Yes    Taking medications regularly:  Not Applicable    Medication side effects:  None    Ability to successfully perform activities of daily living:  No assistance needed    Home Safety:  No safety concerns identified    Hearing Impairment:  No hearing concerns    In the past 6 months, have you been bothered by leaking of urine?  No    In general, how would you rate your overall mental or emotional health?  Excellent      PHQ-2 Total Score: 0    Additional concerns today:  No    Do you feel safe in your environment? Yes    Have you ever done Advance Care Planning? (For example, a Health Directive, POLST, or a discussion with a medical provider or your loved ones about your wishes): Yes, patient states has an Advance Care Planning document and will bring a copy to the clinic.      Fall risk  Fallen 2 or more times in the past year?: No  Any fall with injury in the past year?: No    Cognitive Screening   1) Repeat 3 items (Leader, Season, Table)    2) Clock draw: NORMAL  3) 3 item recall: Recalls 3 objects  Results: 3 items recalled: COGNITIVE IMPAIRMENT LESS LIKELY    Mini-CogTM Copyright ASHLEY Marrero. Licensed by the author for use in [facility]; reprinted with permission ([email or web address]). All rights reserved.        Reviewed and updated as needed this visit by clinical staff  Tobacco  Allergies  Meds  Problems  Med Hx  Surg Hx  Fam Hx      "     Reviewed and updated as needed this visit by Provider  Tobacco  Allergies  Meds  Problems  Med Hx  Surg Hx  Fam Hx         Social History     Tobacco Use     Smoking status: Never Smoker     Smokeless tobacco: Never Used   Substance Use Topics     Alcohol use: Yes     Comment: Socially         Alcohol Use 7/2/2021   Prescreen: >3 drinks/day or >7 drinks/week? No   Prescreen: >3 drinks/day or >7 drinks/week? -       Current providers sharing in care for this patient include:Patient Care Team:  Dara Norris APRN CNP as PCP - General (Nurse Practitioner - Family)  Dara Norris APRN CNP as Assigned PCP  Tara Hong RD as Registered Dietitian (Dietitian, Registered)    The following health maintenance items are reviewed in Epic and correct as of today:  Health Maintenance Due   Topic Date Due     ANNUAL REVIEW OF HM ORDERS  Never done     BMP  01/02/2019     LIPID  06/20/2019     FALL RISK ASSESSMENT  12/12/2019     DTAP/TDAP/TD IMMUNIZATION (4 - Td) 06/01/2021     MAMMO SCREENING  05/06/2021       FHS-7:   Breast CA Risk Assessment (FHS-7) 7/2/2021   Did any of your first-degree relatives have breast or ovarian cancer? No   Did any of your relatives have bilateral breast cancer? No   Did any man in your family have breast cancer? No   Did any woman in your family have breast and ovarian cancer? No   Did any woman in your family have breast cancer before age 50 y? No   Do you have 2 or more relatives with breast and/or ovarian cancer? Yes   Do you have 2 or more relatives with breast and/or bowel cancer? Unknown     Mother's sister had breast cancer    Mammogram Screening: Recommended mammography every 1-2 years with patient discussion and risk factor consideration  Pertinent mammograms are reviewed under the imaging tab.    Review of Systems   Constitutional: Negative for chills and fever.   HENT: Negative for congestion, ear pain, hearing loss and sore throat.    Eyes: Negative  "for pain and visual disturbance.   Respiratory: Negative for cough and shortness of breath.    Cardiovascular: Negative for chest pain, palpitations and peripheral edema.   Gastrointestinal: Negative for abdominal pain, constipation, diarrhea, heartburn, hematochezia and nausea.   Breasts:  Negative for tenderness, breast mass and discharge.   Genitourinary: Negative for dysuria, frequency, genital sores, hematuria, pelvic pain, urgency, vaginal bleeding and vaginal discharge.   Musculoskeletal: Negative for arthralgias, joint swelling and myalgias.   Skin: Negative for rash.   Neurological: Negative for dizziness, weakness, headaches and paresthesias.   Psychiatric/Behavioral: Negative for mood changes. The patient is not nervous/anxious.          OBJECTIVE:   BP (!) 144/80   Pulse 80   Temp 97.7  F (36.5  C) (Oral)   Ht 1.594 m (5' 2.76\")   Wt 60.1 kg (132 lb 6.4 oz)   SpO2 100%   BMI 23.64 kg/m   Estimated body mass index is 23.64 kg/m  as calculated from the following:    Height as of this encounter: 1.594 m (5' 2.76\").    Weight as of this encounter: 60.1 kg (132 lb 6.4 oz).  Physical Exam  GENERAL: healthy, alert and no distress  EYES: Eyes grossly normal to inspection, PERRL and conjunctivae and sclerae normal  HENT: ear canals and TM's normal, nose and mouth without ulcers or lesions  NECK: no adenopathy, no asymmetry, masses, or scars, thyroid normal to palpation and no carotid bruits  RESP: lungs clear to auscultation - no rales, rhonchi or wheezes  CV: regular rate and rhythm, normal S1 S2, no S3 or S4, no murmur, click or rub, no peripheral edema and peripheral pulses strong  ABDOMEN: soft, nontender, no hepatosplenomegaly, no masses and bowel sounds normal  MS: no gross musculoskeletal defects noted, no edema  NEURO: Normal strength and tone, mentation intact and speech normal  PSYCH: mentation appears normal, affect normal/bright        ASSESSMENT / PLAN:       ICD-10-CM    1. Encounter for " "Medicare annual wellness exam  Z00.00 BASIC METABOLIC PANEL   2. Mixed hyperlipidemia  E78.2 Lipid panel reflex to direct LDL Fasting   3. Asymptomatic postmenopausal status  Z78.0 DX Hip/Pelvis/Spine   4. Encounter for screening mammogram for breast cancer  Z12.31 MA SCREENING DIGITAL BILAT - Future  (s+30)       Patient has been advised of split billing requirements and indicates understanding: Yes  COUNSELING:  Reviewed preventive health counseling, as reflected in patient instructions       Regular exercise       Healthy diet/nutrition       Osteoporosis prevention/bone health    Estimated body mass index is 23.64 kg/m  as calculated from the following:    Height as of this encounter: 1.594 m (5' 2.76\").    Weight as of this encounter: 60.1 kg (132 lb 6.4 oz).        She reports that she has never smoked. She has never used smokeless tobacco.      Appropriate preventive services were discussed with this patient, including applicable screening as appropriate for cardiovascular disease, diabetes, osteopenia/osteoporosis, and glaucoma.  As appropriate for age/gender, discussed screening for colorectal cancer, prostate cancer, breast cancer, and cervical cancer. Checklist reviewing preventive services available has been given to the patient.    Reviewed patients plan of care and provided an AVS. The Basic Care Plan (routine screening as documented in Health Maintenance) for Meg meets the Care Plan requirement. This Care Plan has been established and reviewed with the Patient.    Counseling Resources:  ATP IV Guidelines  Pooled Cohorts Equation Calculator  Breast Cancer Risk Calculator  Breast Cancer: Medication to Reduce Risk  FRAX Risk Assessment  ICSI Preventive Guidelines  Dietary Guidelines for Americans, 2010  USDA's MyPlate  ASA Prophylaxis  Lung CA Screening    JACQUELIN [facility]    Identified Health Risks:  "

## 2021-07-02 NOTE — PATIENT INSTRUCTIONS
Patient Education   Personalized Prevention Plan  You are due for the preventive services outlined below.  Your care team is available to assist you in scheduling these services.  If you have already completed any of these items, please share that information with your care team to update in your medical record.  Health Maintenance Due   Topic Date Due     ANNUAL REVIEW OF HM ORDERS  Never done     Basic Metabolic Panel  01/02/2019     Cholesterol Lab  06/20/2019     FALL RISK ASSESSMENT  12/12/2019     Diptheria Tetanus Pertussis (DTAP/TDAP/TD) Vaccine (4 - Td) 06/01/2021     Mammogram  05/06/2021

## 2021-07-12 ENCOUNTER — ANCILLARY PROCEDURE (OUTPATIENT)
Dept: MAMMOGRAPHY | Facility: CLINIC | Age: 66
End: 2021-07-12
Attending: NURSE PRACTITIONER
Payer: COMMERCIAL

## 2021-07-12 ENCOUNTER — ANCILLARY PROCEDURE (OUTPATIENT)
Dept: BONE DENSITY | Facility: CLINIC | Age: 66
End: 2021-07-12
Attending: NURSE PRACTITIONER
Payer: COMMERCIAL

## 2021-07-12 DIAGNOSIS — Z78.0 ASYMPTOMATIC POSTMENOPAUSAL STATUS: ICD-10-CM

## 2021-07-12 DIAGNOSIS — Z12.31 ENCOUNTER FOR SCREENING MAMMOGRAM FOR BREAST CANCER: ICD-10-CM

## 2021-07-12 PROCEDURE — 77080 DXA BONE DENSITY AXIAL: CPT | Performed by: INTERNAL MEDICINE

## 2021-07-12 PROCEDURE — 77067 SCR MAMMO BI INCL CAD: CPT | Mod: TC | Performed by: RADIOLOGY

## 2021-07-12 NOTE — RESULT ENCOUNTER NOTE
Dear Meg,    Your recent test results are attached.      Normal mammogram.    If you have any questions please feel free to contact [phone] or myself via [employer].    Sincerely,  Dara Norris, CNP

## 2021-07-19 NOTE — RESULT ENCOUNTER NOTE
Dear Meg,    Your recent test results are attached.      Your bone density has improved to your lumbar spine, but you continue to have osteoporosis.  I would still recommend additional treatment.  I think that Prolia would be your next option.  If you are interested in starting Prolia injections or have more questions, please let me know.    If you have any questions please feel free to contact [phone] or myself via "[employer]"t.    Sincerely,  Dara Norris, CNP

## 2021-07-26 ENCOUNTER — LAB (OUTPATIENT)
Dept: LAB | Facility: CLINIC | Age: 66
End: 2021-07-26
Payer: COMMERCIAL

## 2021-07-26 DIAGNOSIS — E78.2 MIXED HYPERLIPIDEMIA: ICD-10-CM

## 2021-07-26 DIAGNOSIS — Z00.00 ENCOUNTER FOR MEDICARE ANNUAL WELLNESS EXAM: ICD-10-CM

## 2021-07-26 LAB
ANION GAP SERPL CALCULATED.3IONS-SCNC: 5 MMOL/L (ref 3–14)
BUN SERPL-MCNC: 10 MG/DL (ref 7–30)
CALCIUM SERPL-MCNC: 9.8 MG/DL (ref 8.5–10.1)
CHLORIDE BLD-SCNC: 106 MMOL/L (ref 94–109)
CHOLEST SERPL-MCNC: 234 MG/DL
CO2 SERPL-SCNC: 28 MMOL/L (ref 20–32)
CREAT SERPL-MCNC: 0.91 MG/DL (ref 0.52–1.04)
FASTING STATUS PATIENT QL REPORTED: YES
GFR SERPL CREATININE-BSD FRML MDRD: 65 ML/MIN/1.73M2
GLUCOSE BLD-MCNC: 88 MG/DL (ref 70–99)
HDLC SERPL-MCNC: 74 MG/DL
LDLC SERPL CALC-MCNC: 141 MG/DL
NONHDLC SERPL-MCNC: 160 MG/DL
POTASSIUM BLD-SCNC: 3.8 MMOL/L (ref 3.4–5.3)
SODIUM SERPL-SCNC: 139 MMOL/L (ref 133–144)
TRIGL SERPL-MCNC: 94 MG/DL

## 2021-07-26 PROCEDURE — 80048 BASIC METABOLIC PNL TOTAL CA: CPT

## 2021-07-26 PROCEDURE — 36415 COLL VENOUS BLD VENIPUNCTURE: CPT

## 2021-07-26 PROCEDURE — 80061 LIPID PANEL: CPT

## 2021-07-27 NOTE — RESULT ENCOUNTER NOTE
Meg A Young    Electrolytes and kidney function are normal.  Cholesterol is still somewhat elevated, as it was 3 Y ago.      Sincerely,       DANELLE MOSLEY M.D.    (Dara Kilgore is out of the office this week)

## 2021-09-26 ENCOUNTER — HEALTH MAINTENANCE LETTER (OUTPATIENT)
Age: 66
End: 2021-09-26

## 2022-08-28 ENCOUNTER — HEALTH MAINTENANCE LETTER (OUTPATIENT)
Age: 67
End: 2022-08-28

## 2023-02-19 ASSESSMENT — ENCOUNTER SYMPTOMS
SHORTNESS OF BREATH: 0
CONSTIPATION: 0
NAUSEA: 0
HEMATOCHEZIA: 0
DIARRHEA: 0
HEARTBURN: 0
MYALGIAS: 0
PALPITATIONS: 0
HEMATURIA: 0
DIZZINESS: 0
CHILLS: 0
JOINT SWELLING: 0
BREAST MASS: 0
EYE PAIN: 0
HEADACHES: 0
WEAKNESS: 0
DYSURIA: 0
ARTHRALGIAS: 0
NERVOUS/ANXIOUS: 0
PARESTHESIAS: 0
ABDOMINAL PAIN: 0
SORE THROAT: 0
COUGH: 0
FREQUENCY: 0
FEVER: 0

## 2023-02-19 ASSESSMENT — ACTIVITIES OF DAILY LIVING (ADL): CURRENT_FUNCTION: NO ASSISTANCE NEEDED

## 2023-02-22 ENCOUNTER — OFFICE VISIT (OUTPATIENT)
Dept: FAMILY MEDICINE | Facility: CLINIC | Age: 68
End: 2023-02-22
Payer: COMMERCIAL

## 2023-02-22 VITALS
DIASTOLIC BLOOD PRESSURE: 89 MMHG | HEART RATE: 94 BPM | WEIGHT: 127 LBS | SYSTOLIC BLOOD PRESSURE: 156 MMHG | TEMPERATURE: 97.9 F | OXYGEN SATURATION: 100 % | HEIGHT: 63 IN | BODY MASS INDEX: 22.5 KG/M2

## 2023-02-22 DIAGNOSIS — Z00.00 ENCOUNTER FOR MEDICARE ANNUAL WELLNESS EXAM: Primary | ICD-10-CM

## 2023-02-22 DIAGNOSIS — Z78.0 ASYMPTOMATIC POSTMENOPAUSAL STATUS: ICD-10-CM

## 2023-02-22 DIAGNOSIS — Z12.11 SCREEN FOR COLON CANCER: ICD-10-CM

## 2023-02-22 DIAGNOSIS — E78.2 MIXED HYPERLIPIDEMIA: ICD-10-CM

## 2023-02-22 DIAGNOSIS — Z12.31 ENCOUNTER FOR SCREENING MAMMOGRAM FOR MALIGNANT NEOPLASM OF BREAST: ICD-10-CM

## 2023-02-22 LAB — HGB BLD-MCNC: 13.8 G/DL (ref 11.7–15.7)

## 2023-02-22 PROCEDURE — G0439 PPPS, SUBSEQ VISIT: HCPCS | Performed by: PHYSICIAN ASSISTANT

## 2023-02-22 PROCEDURE — 85018 HEMOGLOBIN: CPT | Performed by: PHYSICIAN ASSISTANT

## 2023-02-22 PROCEDURE — 80061 LIPID PANEL: CPT | Performed by: PHYSICIAN ASSISTANT

## 2023-02-22 PROCEDURE — 36415 COLL VENOUS BLD VENIPUNCTURE: CPT | Performed by: PHYSICIAN ASSISTANT

## 2023-02-22 PROCEDURE — 80048 BASIC METABOLIC PNL TOTAL CA: CPT | Performed by: PHYSICIAN ASSISTANT

## 2023-02-22 ASSESSMENT — ENCOUNTER SYMPTOMS
SHORTNESS OF BREATH: 0
MYALGIAS: 0
DIARRHEA: 0
FEVER: 0
NAUSEA: 0
HEADACHES: 0
PALPITATIONS: 0
ABDOMINAL PAIN: 0
PARESTHESIAS: 0
COUGH: 0
EYE PAIN: 0
HEARTBURN: 0
BREAST MASS: 0
DIZZINESS: 0
HEMATOCHEZIA: 0
DYSURIA: 0
NERVOUS/ANXIOUS: 0
CONSTIPATION: 0
JOINT SWELLING: 0
SORE THROAT: 0
HEMATURIA: 0
CHILLS: 0
ARTHRALGIAS: 0
WEAKNESS: 0
FREQUENCY: 0

## 2023-02-22 ASSESSMENT — ACTIVITIES OF DAILY LIVING (ADL): CURRENT_FUNCTION: NO ASSISTANCE NEEDED

## 2023-02-22 NOTE — PROGRESS NOTES
"SUBJECTIVE:   Meg is a 67 year old who presents for Preventive Visit.  Patient has been advised of split billing requirements and indicates understanding: Yes  Are you in the first 12 months of your Medicare coverage?  No    Healthy Habits:     In general, how would you rate your overall health?  Good    Frequency of exercise:  1 day/week    Duration of exercise:  Less than 15 minutes    Do you usually eat at least 4 servings of fruit and vegetables a day, include whole grains    & fiber and avoid regularly eating high fat or \"junk\" foods?  No    Taking medications regularly:  Not Applicable    Medication side effects:  Not applicable    Ability to successfully perform activities of daily living:  No assistance needed    Home Safety:  No safety concerns identified    Hearing Impairment:  No hearing concerns    In the past 6 months, have you been bothered by leaking of urine?  No    In general, how would you rate your overall mental or emotional health?  Good      PHQ-2 Total Score: 0    Additional concerns today:  No    Willing to schedule colonoscopy.   Has BP cuff, when check BP at home 130/70's. Anxious because its been so long since she's been here. At the Dental office a month ago BP was fine.     Have you ever done Advance Care Planning? (For example, a Health Directive, POLST, or a discussion with a medical provider or your loved ones about your wishes): Yes, patient states has an Advance Care Planning document and will bring a copy to the clinic.       Fall risk  Fallen 2 or more times in the past year?: No  Any fall with injury in the past year?: No    Cognitive Screening   1) Repeat 3 items (Leader, Season, Table)    2) Clock draw: NORMAL  3) 3 item recall: Recalls 3 objects  Results: 3 items recalled: COGNITIVE IMPAIRMENT LESS LIKELY    Mini-CogTM Copyright S Elida. Licensed by the author for use in [employer]; reprinted with permission ([email or web address]). All rights reserved.      Do you " have sleep apnea, excessive snoring or daytime drowsiness?: no    Reviewed and updated as needed this visit by clinical staff   Tobacco  Allergies  Meds  Problems  Med Hx  Surg Hx  Fam Hx          Reviewed and updated as needed this visit by Provider   Tobacco  Allergies  Meds  Problems  Med Hx  Surg Hx  Fam Hx         Social History     Tobacco Use     Smoking status: Never     Smokeless tobacco: Never   Substance Use Topics     Alcohol use: Yes     Comment: Socially     If you drink alcohol do you typically have >3 drinks per day or >7 drinks per week? No    Alcohol Use 2/22/2023   Prescreen: >3 drinks/day or >7 drinks/week? -   Prescreen: >3 drinks/day or >7 drinks/week? No           No Concerns     Current providers sharing in care for this patient include:   Patient Care Team:  [facility] - Dara Vega APRN CNP as Assigned PCP  Tara Hong RD as Registered Dietitian (Dietitian, Registered)    The following health maintenance items are reviewed in Epic and correct as of today:  Health Maintenance   Topic Date Due     COLORECTAL CANCER SCREENING  02/15/2022     MEDICARE ANNUAL WELLNESS VISIT  07/02/2022     ANNUAL REVIEW OF HM ORDERS  07/02/2022     BMP  07/26/2022     LIPID  07/26/2022     DEXA  07/12/2023     MAMMO SCREENING  07/12/2023     FALL RISK ASSESSMENT  02/22/2024     ADVANCE CARE PLANNING  02/22/2028     DTAP/TDAP/TD IMMUNIZATION (5 - Td or Tdap) 07/02/2031     HEPATITIS C SCREENING  Completed     PHQ-2 (once per calendar year)  Completed     INFLUENZA VACCINE  Completed     Pneumococcal Vaccine: 65+ Years  Completed     ZOSTER IMMUNIZATION  Completed     COVID-19 Vaccine  Completed     IPV IMMUNIZATION  Aged Out     MENINGITIS IMMUNIZATION  Aged Out     Lab work is in process  Mammogram Screening: Mammogram Screening: Recommended mammography every 1-2 years with patient discussion and risk factor consideration    FHS-7:  "  Breast CA Risk Assessment (FHS-7) 7/2/2021 7/12/2021 2/19/2023   Did any of your first-degree relatives have breast or ovarian cancer? No No No   Did any of your relatives have bilateral breast cancer? No No No   Did any man in your family have breast cancer? No No No   Did any woman in your family have breast and ovarian cancer? No Unknown No   Did any woman in your family have breast cancer before age 50 y? No Unknown No   Do you have 2 or more relatives with breast and/or ovarian cancer? Yes Unknown No   Do you have 2 or more relatives with breast and/or bowel cancer? Unknown Unknown No       Mammogram Screening: Recommended mammography every 1-2 years with patient discussion and risk factor consideration  Pertinent mammograms are reviewed under the imaging tab.    Review of Systems   Constitutional: Negative for chills and fever.   HENT: Negative for congestion, ear pain, hearing loss and sore throat.    Eyes: Negative for pain and visual disturbance.   Respiratory: Negative for cough and shortness of breath.    Cardiovascular: Negative for chest pain, palpitations and peripheral edema.   Gastrointestinal: Negative for abdominal pain, constipation, diarrhea, heartburn, hematochezia and nausea.   Breasts:  Negative for tenderness, breast mass and discharge.   Genitourinary: Negative for dysuria, frequency, genital sores, hematuria, pelvic pain, urgency, vaginal bleeding and vaginal discharge.   Musculoskeletal: Negative for arthralgias, joint swelling and myalgias.   Skin: Negative for rash.   Neurological: Negative for dizziness, weakness, headaches and paresthesias.   Psychiatric/Behavioral: Negative for mood changes. The patient is not nervous/anxious.          OBJECTIVE:   BP (!) 156/89 (BP Location: Left arm, Patient Position: Sitting, Cuff Size: Adult Regular)   Pulse 94   Temp 97.9  F (36.6  C) (Oral)   Ht 1.588 m (5' 2.5\")   Wt 57.6 kg (127 lb)   SpO2 100%   BMI 22.86 kg/m   Estimated body mass " "index is 22.86 kg/m  as calculated from the following:    Height as of this encounter: 1.588 m (5' 2.5\").    Weight as of this encounter: 57.6 kg (127 lb).  Physical Exam  GENERAL: healthy, alert and no distress  EYES: Eyes grossly normal to inspection, PERRL and conjunctivae and sclerae normal  HENT: ear canals and TM's normal, nose and mouth without ulcers or lesions  NECK: no adenopathy, no asymmetry, masses, or scars and thyroid normal to palpation  RESP: lungs clear to auscultation - no rales, rhonchi or wheezes  CV: regular rate and rhythm, normal S1 S2, no S3 or S4, no murmur, click or rub, no peripheral edema  ABDOMEN: soft, nontender, no hepatosplenomegaly, no masses and bowel sounds normal  MS: no gross musculoskeletal defects noted, no edema  PSYCH: mentation appears normal, affect normal/bright    Diagnostic Test Results:  Labs reviewed in Epic  Results for orders placed or performed in visit on 02/22/23 (from the past 24 hour(s))   Hemoglobin   Result Value Ref Range    Hemoglobin 13.8 11.7 - 15.7 g/dL       ASSESSMENT / PLAN:   (Z00.00) Encounter for Medicare annual wellness exam  (primary encounter diagnosis)  Comment:   Plan: Hemoglobin    (Z12.11) Screen for colon cancer  Comment: Hx of diverticulosis in the left colon and a polyp, colonoscopy recommended every 3 years   Plan: Colonoscopy Screening  Referral      (E78.2) Mixed hyperlipidemia  Comment: Hx of elevated cholesterol, management pending labs.   Plan: Lipid panel reflex to direct LDL Non-fasting,         Basic metabolic panel           (Z78.0) Asymptomatic postmenopausal status  Comment: Hx osteoporosis, dexa recommended every 2 years  Plan: DX Hip/Pelvis/Spine      (Z12.31) Encounter for screening mammogram for malignant neoplasm of breast  Plan: *MA Screening Digital Bilateral              COUNSELING:  Reviewed preventive health counseling, as reflected in patient instructions       Regular exercise       Healthy " diet/nutrition       Dental care       Osteoporosis prevention/bone health        She reports that she has never smoked. She has never used smokeless tobacco.      Appropriate preventive services were discussed with this patient, including applicable screening as appropriate for cardiovascular disease, diabetes, osteopenia/osteoporosis, and glaucoma.  As appropriate for age/gender, discussed screening for colorectal cancer, prostate cancer, breast cancer, and cervical cancer. Checklist reviewing preventive services available has been given to the patient.    Reviewed patients plan of care and provided an AVS. The Basic Care Plan (routine screening as documented in Health Maintenance) for Meg meets the Care Plan requirement. This Care Plan has been established and reviewed with the Patient.          Meghan Santamaria PA-C  [facility]  The student ARI Altamirano acted as a scribe and the encounter documented above was completely performed by myself and the documentation reflects the work I have performed today.   Meghan Santamaria PA-C            Identified Health Risks:    She is at risk for lack of exercise and has been provided with information to increase physical activity for the benefit of her well-being.  The patient was counseled and encouraged to consider modifying their diet and eating habits. She was provided with information on recommended healthy diet options.

## 2023-02-22 NOTE — PATIENT INSTRUCTIONS
Check blood pressure at home once per day. Send a mychart in 1 week with readings.     July- bone density and mammogram.     The colonoscopy department will call you schedule.       Patient Education   Personalized Prevention Plan  You are due for the preventive services outlined below.  Your care team is available to assist you in scheduling these services.  If you have already completed any of these items, please share that information with your care team to update in your medical record.  Health Maintenance Due   Topic Date Due    Colorectal Cancer Screening  02/15/2022    Annual Wellness Visit  07/02/2022    ANNUAL REVIEW OF HM ORDERS  07/02/2022    Basic Metabolic Panel  07/26/2022    Cholesterol Lab  07/26/2022       Exercise for a Healthier Heart  You may wonder how you can improve the health of your heart. If you re thinking about exercise, you re on the right track. You don t need to become an athlete. But you do need a certain amount of brisk exercise to help strengthen your heart. If you have been diagnosed with a heart condition, your healthcare provider may advise exercise to help stabilize your condition. To help make exercise a habit, choose safe, fun activities.      Exercise with a friend. When activity is fun, you're more likely to stick with it.   Before you start  Check with your healthcare provider before starting an exercise program. This is especially important if you have not been active for a while. It's also important if you have a long-term (chronic) health problem such as heart disease, diabetes, or obesity. Or if you are at high risk for having these problems.   Why exercise?  Exercising regularly offers many healthy rewards. It can help you do all of the following:   Improve your blood cholesterol level to help prevent further heart trouble  Lower your blood pressure to help prevent a stroke or heart attack  Control diabetes, or reduce your risk of getting this disease  Improve your  heart and lung function  Reach and stay at a healthy weight  Make your muscles stronger so you can stay active  Prevent falls and fractures by slowing the loss of bone mass (osteoporosis)  Manage stress better  Reduce your blood pressure  Improve your sense of self and your body image  Exercise tips    Ease into your routine. Set small goals. Then build on them. If you are not sure what your activity level should be, talk with your healthcare provider first before starting an exercise routine.  Exercise on most days. Aim for a total of 150 minutes (2 hours and 30 minutes) or more of moderate-intensity aerobic activity each week. Or 75 minutes (1 hour and 15 minutes) or more of vigorous-intensity aerobic activity each week. Or try for a combination of both. Moderate activity means that you breathe heavier and your heart rate increases but you can still talk. Think about doing 40 minutes of moderate exercise, 3 to 4 times a week. For best results, activity should last for about 40 minutes to lower blood pressure and cholesterol. It's OK to work up to the 40-minute period over time. Examples of moderate-intensity activity are walking 1 mile in 15 minutes. Or doing 30 to 45 minutes of yard work.  Step up your daily activity level.  Along with your exercise program, try being more active the whole day. Walk instead of drive. Or park further away so that you take more steps each day. Do more household tasks or yard work. You may not be able to meet the advised mount of physical activity. But doing some moderate- or vigorous-intensity aerobic activity can help reduce your risk for heart disease. Your healthcare provider can help you figure out what is best for you.  Choose 1 or more activities you enjoy.  Walking is one of the easiest things you can do. You can also try swimming, riding a bike, dancing, or taking an exercise class.    When to call your healthcare provider  Call your healthcare provider if you have any of  these:   Chest pain or feel dizzy or lightheaded  Burning, tightness, pressure, or heaviness in your chest, neck, shoulders, back, or arms  Abnormal shortness of breath  More joint or muscle pain  A very fast or irregular heartbeat (palpitations)  [employer] last reviewed this educational content on 7/1/2019 2000-2021 [employer]. All rights reserved. This information is not intended as a substitute for professional medical care. Always follow your healthcare professional's instructions.          Understanding USDA MyPlate  The USDA has guidelines to help you make healthy food choices. These are called MyPlate. MyPlate shows the food groups that make up healthy meals using the image of a place setting. Before you eat, think about the healthiest choices for what to put on your plate or in your cup or bowl. To learn more about building a healthy plate, visit www.choosemyplate.gov.    The food groups  Fruits. Any fruit or 100% fruit juice counts as part of the Fruit Group. Fruits may be fresh, canned, frozen, or dried, and may be whole, cut-up, or pureed. Make 1/2 of your plate fruits and vegetables.  Vegetables. Any vegetable or 100% vegetable juice counts as a member of the Vegetable Group. Vegetables may be fresh, frozen, canned, or dried. They can be served raw or cooked and may be whole, cut-up, or mashed. Make 1/2 of your plate fruits and vegetables.  Grains. All foods made from grains are part of the Grains Group. These include wheat, rice, oats, cornmeal, and barley. Grains are often used to make foods such as bread, pasta, oatmeal, cereal, tortillas, and grits. Grains should be no more than 1/4 of your plate. At least half of your grains should be whole grains.  Protein. This group includes meat, poultry, seafood, beans and peas, eggs, processed soy products (such as tofu), nuts (including nut butters), and seeds. Make protein choices no more than 1/4 of your plate. Meat and poultry choices  should be lean or low fat.  Dairy. The Dairy Group includes all fluid milk products and foods made from milk that contain calcium, such as yogurt and cheese. (Foods that have little calcium, such as cream, butter, and cream cheese, are not part of this group.) Most dairy choices should be low-fat or fat-free.  Oils. Oils aren't a food group, but they do contain essential nutrients. However it's important to watch your intake of oils. These are fats that are liquid at room temperature. They include canola, corn, olive, soybean, vegetable, and sunflower oil. Foods that are mainly oil include mayonnaise, certain salad dressings, and soft margarines. You likely already get your daily oil allowance from the foods you eat.  Things to limit  Eating healthy also means limiting these things in your diet:     Salt (sodium). Many processed foods have a lot of sodium. To keep sodium intake down, eat fresh vegetables, meats, poultry, and seafood when possible. Purchase low-sodium, reduced-sodium, or no-salt-added food products at the store. And don't add salt to your meals at home. Instead, season them with herbs and spices such as dill, oregano, cumin, and paprika. Or try adding flavor with lemon or lime zest and juice.  Saturated fat. Saturated fats are most often found in animal products such as beef, pork, and chicken. They are often solid at room temperature, such as butter. To reduce your saturated fat intake, choose leaner cuts of meat and poultry. And try healthier cooking methods such as grilling, broiling, roasting, or baking. For a simple lower-fat swap, use plain nonfat yogurt instead of mayonnaise when making potato salad or macaroni salad.  Added sugars. These are sugars added to foods. They are in foods such as ice cream, candy, soda, fruit drinks, sports drinks, energy drinks, cookies, pastries, jams, and syrups. Cut down on added sugars by sharing sweet treats with a family member or friend. You can also choose  fruit for dessert, and drink water or other unsweetened beverages.     [employer] last reviewed this educational content on 6/1/2020 2000-2021 [employer]. All rights reserved. This information is not intended as a substitute for professional medical care. Always follow your healthcare professional's instructions.

## 2023-02-23 LAB
ANION GAP SERPL CALCULATED.3IONS-SCNC: 6 MMOL/L (ref 3–14)
BUN SERPL-MCNC: 13 MG/DL (ref 7–30)
CALCIUM SERPL-MCNC: 10.2 MG/DL (ref 8.5–10.1)
CHLORIDE BLD-SCNC: 104 MMOL/L (ref 94–109)
CHOLEST SERPL-MCNC: 256 MG/DL
CO2 SERPL-SCNC: 28 MMOL/L (ref 20–32)
CREAT SERPL-MCNC: 0.85 MG/DL (ref 0.52–1.04)
FASTING STATUS PATIENT QL REPORTED: YES
GFR SERPL CREATININE-BSD FRML MDRD: 74 ML/MIN/1.73M2
GLUCOSE BLD-MCNC: 105 MG/DL (ref 70–99)
HDLC SERPL-MCNC: 78 MG/DL
LDLC SERPL CALC-MCNC: 126 MG/DL
NONHDLC SERPL-MCNC: 178 MG/DL
POTASSIUM BLD-SCNC: 3.9 MMOL/L (ref 3.4–5.3)
SODIUM SERPL-SCNC: 138 MMOL/L (ref 133–144)
TRIGL SERPL-MCNC: 259 MG/DL

## 2023-02-24 NOTE — RESULT ENCOUNTER NOTE
The 10-year ASCVD risk score (Sacha CRISOSTOMO, et al., 2019) is: 12.3%    Values used to calculate the score:      Age: 67 years      Sex: Female      Is Non- : No      Diabetic: No      Tobacco smoker: No      Systolic Blood Pressure: 156 mmHg      Is BP treated: No      HDL Cholesterol: 78 mg/dL      Total Cholesterol: 256 mg/dL    Francisco Weaver,     Your cholesterol numbers are high and I would recommend considering treatment with medications. Please send me a message if you are interested in this.   The rest of your labs were stable.   Meghan Santamaria PA-C

## 2023-03-09 ENCOUNTER — TELEPHONE (OUTPATIENT)
Dept: GASTROENTEROLOGY | Facility: CLINIC | Age: 68
End: 2023-03-09
Payer: COMMERCIAL

## 2023-03-09 NOTE — TELEPHONE ENCOUNTER
Screening Questions  BLUE  KIND OF PREP RED  LOCATION [review exclusion criteria] GREEN  SEDATION TYPE        y Are you active on mychart?       [place] Ordering/Referring Provider?        [employer] What type of coverage do you have?      y Have you had a positive covid test in the last 14 days?     23.2 1. BMI  [BMI 40+ - review exclusion criteria]    y  2. Are you able to give consent for your medical care? [IF NO,RN REVIEW]          n  3. Are you taking any prescription pain medications on a routine schedule   (ex narcotics: oxycodone, roxicodone, oxycontin,  and percocet)? [RN Review]        n  3a. EXTENDED PREP What kind of prescription?     n 4. Do you have any chemical dependencies such as alcohol, street drugs, or methadone?        **If yes 3- 5 , please schedule with MAC sedation.**          IF YES TO ANY 6 - 10 - HOSPITAL SETTING ONLY.     n 6.   Do you need assistance transferring?     n 7.   Have you had a heart or lung transplant?    n 8.   Are you currently on dialysis?   n 9.   Do you use daily home oxygen?   n 10. Do you take nitroglycerin?   10a. n If yes, how often?     11. [FEMALES]  n Are you currently pregnant?    11a. n If yes, how many weeks? [ Greater than 12 weeks, OR NEEDED]    n 12. Do you have Pulmonary Hypertension? *NEED PAC APPT AT [facility] w/ [facility]*     n 13. [review exclusion criteria]  Do you have any implantable devices in your body (pacemaker, defib, LVAD)?    n 14. In the past 6 months, have you had any heart related issues including cardiomyopathy or heart attack?     14a. n If yes, did it require cardiac stenting if so when?     n 15. Have you had a stroke or Transient ischemic attack (TIA - aka  mini stroke ) within 6 months?      n 16. Do you have mod to severe Obstructive Sleep Apnea?  [Hospital only]    n 17. Do you have SEVERE AND UNCONTROLLED asthma? *NEED PAC APPT AT [facility] w/MAC*     18. Are you currently taking any blood thinners?     18a. No. Continue to 19.   18b.     n 19.  "Do you take the medication Phentermine?    19a. If yes, \"Hold for 7 days before procedure.  Please consult your prescribing provider if you have questions about holding this medication.\"     n  20. Do you have chronic kidney disease?      n  21. Do you have a diagnosis of diabetes?     n  22. On a regular basis do you go 3-5 days between bowel movements?      23. Preferred LOCAL Pharmacy for Pre Prescription    [ LIST ONLY ONE PHARMACY]     [facility] [id number] [place] [facility] - [place], [place] - [address]        - CLOSING REMINDERS -    Informed patient they will need an adult    Cannot take any type of public or medical transportation alone    Conscious Sedation- Needs  for 6 hours after the procedure       MAC/General-Needs  for 24 hours after procedure    Pre-Procedure Covid test to be completed [[facility] PCR Testing Required]    Confirmed Nurse will call to complete assessment       - SCHEDULING DETAILS -  n Hospital Setting Required? If yes, what is the exclusion?:    Bernard  Surgeon    5/2/23  Date of Procedure  Lower Endoscopy [Colonoscopy]  Type of Procedure Scheduled  [facility]-If you answer yes to questions #8, #20, #21Which Colonoscopy Prep was Sent?     CS Sedation Type     n PAC / Pre-op Required                 "

## 2023-04-18 ENCOUNTER — TELEPHONE (OUTPATIENT)
Dept: GASTROENTEROLOGY | Facility: CLINIC | Age: 68
End: 2023-04-18
Payer: COMMERCIAL

## 2023-04-18 DIAGNOSIS — Z12.11 SCREEN FOR COLON CANCER: Primary | ICD-10-CM

## 2023-04-18 RX ORDER — BISACODYL 5 MG/1
TABLET, DELAYED RELEASE ORAL
Qty: 4 TABLET | Refills: 0 | Status: SHIPPED | OUTPATIENT
Start: 2023-04-18 | End: 2023-08-04

## 2023-04-18 NOTE — TELEPHONE ENCOUNTER
Attempted to contact patient regarding upcoming Colonoscopy  procedure on 05.02.2023 for pre assessment questions. No answer.     Left message to return call to [phone] #4    Discuss Covid policy and designated  policy.    Pre op exam? N/A    Arrival time: 0935. Procedure time: 1020    Facility location: [facility] [place]; [address], [place] [address]    Sedation type: Conscious sedation     Anticoagulants: No    Electronic implanted devices? No    Diabetic? No    Indication for procedure: screening colonoscopy    Bowel prep recommendation: Standard Golytely      Prep instructions sent via [facility]. Bowel prep script sent to [facility] [id number] [place] [facility] [address]      Merna Fay RN  Endoscopy Procedure Pre Assessment RN

## 2023-04-19 NOTE — TELEPHONE ENCOUNTER
Patient returned call.    Pre assessment questions completed for upcoming Colonoscopy  procedure scheduled on 5/2/23    COVID policy reviewed.     Reviewed procedural arrival time 0935, procedure time 1020 and facility location [facility]; [address], [place] [address]    Designated  policy reviewed. Instructed to have someone stay 6 hours post procedure.     Patient stated they have already reviewed bowel prep instructions and had no questions. NPO instructions reviewed.    Patient verbalized understanding and had no questions at this time. Pt was instructed to call if any questions or concerns arise.       Angeles Orosco RN  Endoscopy Procedure Pre Assessment RN

## 2023-05-02 ENCOUNTER — HOSPITAL ENCOUNTER (OUTPATIENT)
Facility: AMBULATORY SURGERY CENTER | Age: 68
Discharge: HOME | End: 2023-05-02
Attending: INTERNAL MEDICINE | Admitting: SPECIALIST
Payer: COMMERCIAL

## 2023-05-02 VITALS
RESPIRATION RATE: 16 BRPM | TEMPERATURE: 98.3 F | OXYGEN SATURATION: 98 % | HEART RATE: 66 BPM | DIASTOLIC BLOOD PRESSURE: 84 MMHG | SYSTOLIC BLOOD PRESSURE: 114 MMHG

## 2023-05-02 LAB — COLONOSCOPY: NORMAL

## 2023-05-02 PROCEDURE — G8907 PT DOC NO EVENTS ON DISCHARG: HCPCS

## 2023-05-02 PROCEDURE — 45380 COLONOSCOPY AND BIOPSY: CPT | Mod: PT

## 2023-05-02 PROCEDURE — 88305 TISSUE EXAM BY PATHOLOGIST: CPT | Performed by: PATHOLOGY

## 2023-05-02 PROCEDURE — G8918 PT W/O PREOP ORDER IV AB PRO: HCPCS

## 2023-05-02 RX ORDER — FENTANYL CITRATE 50 UG/ML
INJECTION, SOLUTION INTRAMUSCULAR; INTRAVENOUS PRN
Status: DISCONTINUED | OUTPATIENT
Start: 2023-05-02 | End: 2023-05-02 | Stop reason: HOSPADM

## 2023-05-02 RX ORDER — NALOXONE HYDROCHLORIDE 0.4 MG/ML
0.2 INJECTION, SOLUTION INTRAMUSCULAR; INTRAVENOUS; SUBCUTANEOUS
Status: DISCONTINUED | OUTPATIENT
Start: 2023-05-02 | End: 2023-05-03 | Stop reason: HOSPADM

## 2023-05-02 RX ORDER — ONDANSETRON 2 MG/ML
4 INJECTION INTRAMUSCULAR; INTRAVENOUS
Status: DISCONTINUED | OUTPATIENT
Start: 2023-05-02 | End: 2023-05-03 | Stop reason: HOSPADM

## 2023-05-02 RX ORDER — FLUMAZENIL 0.1 MG/ML
0.2 INJECTION, SOLUTION INTRAVENOUS
Status: ACTIVE | OUTPATIENT
Start: 2023-05-02 | End: 2023-05-02

## 2023-05-02 RX ORDER — PROCHLORPERAZINE MALEATE 5 MG
5 TABLET ORAL EVERY 6 HOURS PRN
Status: DISCONTINUED | OUTPATIENT
Start: 2023-05-02 | End: 2023-05-03 | Stop reason: HOSPADM

## 2023-05-02 RX ORDER — ONDANSETRON 4 MG/1
4 TABLET, ORALLY DISINTEGRATING ORAL EVERY 6 HOURS PRN
Status: DISCONTINUED | OUTPATIENT
Start: 2023-05-02 | End: 2023-05-03 | Stop reason: HOSPADM

## 2023-05-02 RX ORDER — ONDANSETRON 2 MG/ML
4 INJECTION INTRAMUSCULAR; INTRAVENOUS EVERY 6 HOURS PRN
Status: DISCONTINUED | OUTPATIENT
Start: 2023-05-02 | End: 2023-05-03 | Stop reason: HOSPADM

## 2023-05-02 RX ORDER — LIDOCAINE 40 MG/G
CREAM TOPICAL
Status: DISCONTINUED | OUTPATIENT
Start: 2023-05-02 | End: 2023-05-03 | Stop reason: HOSPADM

## 2023-05-02 RX ORDER — NALOXONE HYDROCHLORIDE 0.4 MG/ML
0.4 INJECTION, SOLUTION INTRAMUSCULAR; INTRAVENOUS; SUBCUTANEOUS
Status: DISCONTINUED | OUTPATIENT
Start: 2023-05-02 | End: 2023-05-03 | Stop reason: HOSPADM

## 2023-05-02 NOTE — H&P
[facility] Anesthesia Pre-op History and Physical    Meg Beverly MRN# [id number]   Age: 67 year old YOB: 1955            Date of Exam 5/2/2023           Primary care provider: Meghan Santamaria         Chief Complaint and/or Reason for Procedure:     History of SSA.  History of diverticulitis s/p sigmoidectomy         Active problem list:     Patient Active Problem List    Diagnosis Date Noted     Diverticulitis of colon with perforation 12/12/2018     Priority: Medium     Hospitalized at [facility] 12/12/18-12/14/18       Age-related osteoporosis without current pathological fracture 08/03/2018     Priority: Medium     Functional diarrhea 06/06/2018     Priority: Medium     Mixed hyperlipidemia 01/02/2018     Priority: Medium     Midline cystocele 01/02/2018     Priority: Medium            Medications (include herbals and vitamins):   Any Plavix use in the last 7 days? No     Current Outpatient Medications   Medication Sig     atorvastatin (LIPITOR) 20 MG tablet Take 1 tablet (20 mg) by mouth daily     calcium carbonate (OS-LAURI 500 MG [place]) 1250 MG tablet Take 1 tablet by mouth 2 times daily     Multiple Vitamin (MULTI VITAMIN PO) With biotin     bisacodyl (DULCOLAX) 5 MG EC tablet Take 2 tablets at 3 pm the day before your procedure. If your procedure is before 11 am, take 2 additional tablets at 11 pm. If your procedure is after 11 am, take 2 additional tablets at 6 am. For additional instructions refer to your colonoscopy prep instructions.     melatonin 1 MG CAPS Take 2 mg by mouth At Bedtime     polyethylene glycol (GOLYTELY) 236 g suspension The night before the exam at 6 pm drink an 8-ounce glass every 15 minutes until the jug is half empty. If you arrive before 11 AM: Drink the other half of the Golytely jug at 11 PM night before procedure. If you arrive after 11 AM: Drink the other half of the Golytely jug at 6 AM day of procedure. For additional instructions refer to  your colonoscopy prep instructions.     Current Facility-Administered Medications   Medication     lidocaine (LMX4) kit     lidocaine 1 % 0.1-1 mL     ondansetron (ZOFRAN) injection 4 mg     sodium chloride (PF) 0.9% PF flush 3 mL     sodium chloride (PF) 0.9% PF flush 3 mL             Allergies:      Allergies   Allergen Reactions     Fosamax [Alendronate]      mild tingling and swelling of the lips, itchiness/tingling in my nose, and overall itchiness of the scalp, neck, and ears, along with a mild feeling of being short of breath     Latex Rash     Allergy to Latex? No  Allergy to tape?   No  Intolerances:             Physical Exam:   All vitals have been reviewed  Patient Vitals for the past 8 hrs:   BP Temp Temp src Resp SpO2   05/02/23 0937 (!) 145/87 98.3  F (36.8  C) Temporal 16 98 %     No intake/output data recorded.  Lungs:   No increased work of breathing, good air exchange, clear to auscultation bilaterally, no crackles or wheezing     Cardiovascular:   normal S1 and S2             Lab / Radiology Results:            Anesthetic risk and/or ASA classification:   2    Ksenia Proctor DO

## 2023-05-04 LAB
PATH REPORT.COMMENTS IMP SPEC: NORMAL
PATH REPORT.COMMENTS IMP SPEC: NORMAL
PATH REPORT.FINAL DX SPEC: NORMAL
PATH REPORT.GROSS SPEC: NORMAL
PATH REPORT.MICROSCOPIC SPEC OTHER STN: NORMAL
PATH REPORT.RELEVANT HX SPEC: NORMAL
PHOTO IMAGE: NORMAL

## 2023-05-10 ENCOUNTER — TELEPHONE (OUTPATIENT)
Dept: GASTROENTEROLOGY | Facility: CLINIC | Age: 68
End: 2023-05-10
Payer: COMMERCIAL

## 2023-05-10 NOTE — TELEPHONE ENCOUNTER
Called pt back and relayed message below:    Pt had no further questions or concerns.    Merna East RN on 5/10/2023 at 2:40 PM      Received: Today  Ksenia Proctor DO Faust, Jennifer L, RN  I think it would be reasonable to defer further surveillance colonoscopies given the low risk lesion found on her most recent exam - the previous 3 year recommendation was as a follow-up of a different polyp in her cecum which there was no evidence of recurrence of.  Of course, should she develop changes in bowel habits, blood in the stool, etc she should undergo a repeat exam   Thanks           Previous Messages       ----- Message -----   From: Merna East RN   Sent: 5/10/2023   2:05 PM CDT   To: Ksenia Proctor DO   Subject: Pt has furthere questions regarding her colo*     Hi there,     Pt is calling and she has more questions / concerns about her recent colonoscopy results. She is wanting to know why prior to colonoscopy she was told if everything looked good you would move her to 5 yrs surveillance. She was on a 3 yr surveillance.     So after her results came back you stated to recheck in 7-10 yrs and now she is wondering why so long since she was high risk? And due to her age would she needs to recheck because that would push her out to potentially 77 yrs old.     Please advise.     Merna East RN on 5/10/2023 at 2:03 PM

## 2023-05-25 ENCOUNTER — LAB (OUTPATIENT)
Dept: LAB | Facility: CLINIC | Age: 68
End: 2023-05-25
Payer: COMMERCIAL

## 2023-05-25 DIAGNOSIS — E78.2 MIXED HYPERLIPIDEMIA: ICD-10-CM

## 2023-05-25 LAB
ALBUMIN SERPL BCG-MCNC: 4.5 G/DL (ref 3.5–5.2)
ALP SERPL-CCNC: 100 U/L (ref 35–104)
ALT SERPL W P-5'-P-CCNC: 28 U/L (ref 10–35)
AST SERPL W P-5'-P-CCNC: 28 U/L (ref 10–35)
BILIRUB DIRECT SERPL-MCNC: <0.2 MG/DL (ref 0–0.3)
BILIRUB SERPL-MCNC: 0.5 MG/DL
CHOLEST SERPL-MCNC: 168 MG/DL
HDLC SERPL-MCNC: 74 MG/DL
LDLC SERPL CALC-MCNC: 67 MG/DL
NONHDLC SERPL-MCNC: 94 MG/DL
PROT SERPL-MCNC: 7.6 G/DL (ref 6.4–8.3)
TRIGL SERPL-MCNC: 137 MG/DL

## 2023-05-25 PROCEDURE — 36415 COLL VENOUS BLD VENIPUNCTURE: CPT

## 2023-05-25 PROCEDURE — 80076 HEPATIC FUNCTION PANEL: CPT

## 2023-05-25 PROCEDURE — 80061 LIPID PANEL: CPT

## 2023-05-26 NOTE — RESULT ENCOUNTER NOTE
Meg,     Your cholesterol has improved significantly on the atorvastatin. We will continue with this.   Meghan Santamaria PA-C

## 2023-07-25 ENCOUNTER — ANCILLARY PROCEDURE (OUTPATIENT)
Dept: BONE DENSITY | Facility: CLINIC | Age: 68
End: 2023-07-25
Attending: PHYSICIAN ASSISTANT
Payer: COMMERCIAL

## 2023-07-25 ENCOUNTER — ANCILLARY ORDERS (OUTPATIENT)
Dept: FAMILY MEDICINE | Facility: CLINIC | Age: 68
End: 2023-07-25

## 2023-07-25 ENCOUNTER — ANCILLARY PROCEDURE (OUTPATIENT)
Dept: MAMMOGRAPHY | Facility: CLINIC | Age: 68
End: 2023-07-25
Attending: PHYSICIAN ASSISTANT
Payer: COMMERCIAL

## 2023-07-25 DIAGNOSIS — Z78.0 ASYMPTOMATIC POSTMENOPAUSAL STATUS: ICD-10-CM

## 2023-07-25 DIAGNOSIS — Z00.00 ENCOUNTER FOR MEDICARE ANNUAL WELLNESS EXAM: Primary | ICD-10-CM

## 2023-07-25 DIAGNOSIS — Z12.31 ENCOUNTER FOR SCREENING MAMMOGRAM FOR MALIGNANT NEOPLASM OF BREAST: ICD-10-CM

## 2023-07-25 DIAGNOSIS — E78.2 MIXED HYPERLIPIDEMIA: ICD-10-CM

## 2023-07-25 DIAGNOSIS — Z12.11 SCREEN FOR COLON CANCER: ICD-10-CM

## 2023-07-25 PROCEDURE — 77063 BREAST TOMOSYNTHESIS BI: CPT | Mod: TC | Performed by: RADIOLOGY

## 2023-07-25 PROCEDURE — 77080 DXA BONE DENSITY AXIAL: CPT | Performed by: INTERNAL MEDICINE

## 2023-07-25 PROCEDURE — 77067 SCR MAMMO BI INCL CAD: CPT | Mod: TC | Performed by: RADIOLOGY

## 2023-08-08 DIAGNOSIS — M81.0 AGE-RELATED OSTEOPOROSIS WITHOUT CURRENT PATHOLOGICAL FRACTURE: Primary | ICD-10-CM

## 2023-08-08 NOTE — RESULT ENCOUNTER NOTE
Francisco Weaver,     Your bone density scan shows continued osteoporosis. With your allergy to fosamax I would recommend that you see our endocrinology team to discuss other treatments for your osteoporosis to help prevent fractures. I have placed the referral and they should call to schedule you.   Meghan Santamaria PA-C

## 2024-01-23 ENCOUNTER — PATIENT OUTREACH (OUTPATIENT)
Dept: CARE COORDINATION | Facility: CLINIC | Age: 69
End: 2024-01-23
Payer: COMMERCIAL

## 2024-02-06 ENCOUNTER — PATIENT OUTREACH (OUTPATIENT)
Dept: CARE COORDINATION | Facility: CLINIC | Age: 69
End: 2024-02-06
Payer: COMMERCIAL

## 2024-02-14 DIAGNOSIS — E78.2 MIXED HYPERLIPIDEMIA: ICD-10-CM

## 2024-02-14 RX ORDER — ATORVASTATIN CALCIUM 20 MG/1
20 TABLET, FILM COATED ORAL DAILY
Qty: 90 TABLET | Refills: 0 | Status: SHIPPED | OUTPATIENT
Start: 2024-02-14 | End: 2024-02-23

## 2024-02-20 SDOH — HEALTH STABILITY: PHYSICAL HEALTH: ON AVERAGE, HOW MANY DAYS PER WEEK DO YOU ENGAGE IN MODERATE TO STRENUOUS EXERCISE (LIKE A BRISK WALK)?: 6 DAYS

## 2024-02-20 SDOH — HEALTH STABILITY: PHYSICAL HEALTH: ON AVERAGE, HOW MANY MINUTES DO YOU ENGAGE IN EXERCISE AT THIS LEVEL?: 30 MIN

## 2024-02-20 ASSESSMENT — SOCIAL DETERMINANTS OF HEALTH (SDOH): HOW OFTEN DO YOU GET TOGETHER WITH FRIENDS OR RELATIVES?: ONCE A WEEK

## 2024-02-23 ENCOUNTER — OFFICE VISIT (OUTPATIENT)
Dept: FAMILY MEDICINE | Facility: CLINIC | Age: 69
End: 2024-02-23
Payer: COMMERCIAL

## 2024-02-23 VITALS
OXYGEN SATURATION: 100 % | DIASTOLIC BLOOD PRESSURE: 82 MMHG | HEIGHT: 63 IN | TEMPERATURE: 98 F | WEIGHT: 126 LBS | HEART RATE: 94 BPM | RESPIRATION RATE: 25 BRPM | SYSTOLIC BLOOD PRESSURE: 152 MMHG | BODY MASS INDEX: 22.32 KG/M2

## 2024-02-23 DIAGNOSIS — M81.0 AGE-RELATED OSTEOPOROSIS WITHOUT CURRENT PATHOLOGICAL FRACTURE: ICD-10-CM

## 2024-02-23 DIAGNOSIS — E78.2 MIXED HYPERLIPIDEMIA: ICD-10-CM

## 2024-02-23 DIAGNOSIS — Z00.00 ENCOUNTER FOR MEDICARE ANNUAL WELLNESS EXAM: Primary | ICD-10-CM

## 2024-02-23 LAB
ALT SERPL W P-5'-P-CCNC: 26 U/L (ref 0–50)
ANION GAP SERPL CALCULATED.3IONS-SCNC: 13 MMOL/L (ref 7–15)
BUN SERPL-MCNC: 13.1 MG/DL (ref 8–23)
CALCIUM SERPL-MCNC: 10.1 MG/DL (ref 8.8–10.2)
CHLORIDE SERPL-SCNC: 104 MMOL/L (ref 98–107)
CHOLEST SERPL-MCNC: 175 MG/DL
CREAT SERPL-MCNC: 0.89 MG/DL (ref 0.51–0.95)
DEPRECATED HCO3 PLAS-SCNC: 25 MMOL/L (ref 22–29)
EGFRCR SERPLBLD CKD-EPI 2021: 69 ML/MIN/1.73M2
FASTING STATUS PATIENT QL REPORTED: YES
GLUCOSE SERPL-MCNC: 101 MG/DL (ref 70–99)
HDLC SERPL-MCNC: 79 MG/DL
LDLC SERPL CALC-MCNC: 79 MG/DL
NONHDLC SERPL-MCNC: 96 MG/DL
POTASSIUM SERPL-SCNC: 4.3 MMOL/L (ref 3.4–5.3)
SODIUM SERPL-SCNC: 142 MMOL/L (ref 135–145)
TRIGL SERPL-MCNC: 83 MG/DL

## 2024-02-23 PROCEDURE — 80061 LIPID PANEL: CPT | Performed by: PHYSICIAN ASSISTANT

## 2024-02-23 PROCEDURE — 84460 ALANINE AMINO (ALT) (SGPT): CPT | Performed by: PHYSICIAN ASSISTANT

## 2024-02-23 PROCEDURE — 80048 BASIC METABOLIC PNL TOTAL CA: CPT | Performed by: PHYSICIAN ASSISTANT

## 2024-02-23 PROCEDURE — G0439 PPPS, SUBSEQ VISIT: HCPCS | Performed by: PHYSICIAN ASSISTANT

## 2024-02-23 PROCEDURE — 99213 OFFICE O/P EST LOW 20 MIN: CPT | Mod: 25 | Performed by: PHYSICIAN ASSISTANT

## 2024-02-23 PROCEDURE — 36415 COLL VENOUS BLD VENIPUNCTURE: CPT | Performed by: PHYSICIAN ASSISTANT

## 2024-02-23 RX ORDER — ATORVASTATIN CALCIUM 20 MG/1
20 TABLET, FILM COATED ORAL DAILY
Qty: 90 TABLET | Refills: 3 | Status: SHIPPED | OUTPATIENT
Start: 2024-02-23

## 2024-02-23 RX ORDER — RESPIRATORY SYNCYTIAL VIRUS VACCINE 120MCG/0.5
0.5 KIT INTRAMUSCULAR ONCE
Qty: 1 EACH | Refills: 0 | Status: CANCELLED | OUTPATIENT
Start: 2024-02-23 | End: 2024-02-23

## 2024-02-23 NOTE — PATIENT INSTRUCTIONS
Please follow up at your PHARMACY for the following vaccines:  RSV (Respiratory Syncytial Virus)      Patient Education    Preventive Care Advice   This is general advice given by our system to help you stay healthy. However, your care team may have specific advice just for you. Please talk to your care team about your preventive care needs.  Nutrition  Eat 5 or more servings of fruits and vegetables each day.  Try wheat bread, brown rice and whole grain pasta (instead of white bread, rice, and pasta).  Get enough calcium and vitamin D. Check the label on foods and aim for 100% of the RDA (recommended daily allowance).  Lifestyle  Exercise at least 150 minutes each week  (30 minutes a day, 5 days a week).  Do muscle strengthening activities 2 days a week. These help control your weight and prevent disease.  No smoking.  Wear sunscreen to prevent skin cancer.  Have a dental exam and cleaning every 6 months.  Yearly exams  See your health care team every year to talk about:  Any changes in your health.  Any medicines your care team has prescribed.  Preventive care, family planning, and ways to prevent chronic diseases.  Shots (vaccines)   HPV shots (up to age 26), if you've never had them before.  Hepatitis B shots (up to age 59), if you've never had them before.  COVID-19 shot: Get this shot when it's due.  Flu shot: Get a flu shot every year.  Tetanus shot: Get a tetanus shot every 10 years.  Pneumococcal, hepatitis A, and RSV shots: Ask your care team if you need these based on your risk.  Shingles shot (for age 50 and up)  General health tests  Diabetes screening:  Starting at age 35, Get screened for diabetes at least every 3 years.  If you are younger than age 35, ask your care team if you should be screened for diabetes.  Cholesterol test: At age 39, start having a cholesterol test every 5 years, or more often if advised.  Bone density scan (DEXA): At age 50, ask your care team if you should have this scan for  osteoporosis (brittle bones).  Hepatitis C: Get tested at least once in your life.  STIs (sexually transmitted infections)  Before age 24: Ask your care team if you should be screened for STIs.  After age 24: Get screened for STIs if you're at risk. You are at risk for STIs (including HIV) if:  You are sexually active with more than one person.  You don't use condoms every time.  You or a partner was diagnosed with a sexually transmitted infection.  If you are at risk for HIV, ask about PrEP medicine to prevent HIV.  Get tested for HIV at least once in your life, whether you are at risk for HIV or not.  Cancer screening tests  Cervical cancer screening: If you have a cervix, begin getting regular cervical cancer screening tests starting at age 21.  Breast cancer scan (mammogram): If you've ever had breasts, begin having regular mammograms starting at age 40. This is a scan to check for breast cancer.  Colon cancer screening: It is important to start screening for colon cancer at age 45.  Have a colonoscopy test every 10 years (or more often if you're at risk) Or, ask your provider about stool tests like a FIT test every year or Cologuard test every 3 years.  To learn more about your testing options, visit:   https://www.[employer]/532879.pdf.  For help making a decision, visit:   https://bit.ly/gp27557.  Prostate cancer screening test: If you have a prostate, ask your care team if a prostate cancer screening test (PSA) at age 55 is right for you.  Lung cancer screening: If you are a current or former smoker ages 50 to 80, ask your care team if ongoing lung cancer screenings are right for you.  For informational purposes only. Not to replace the advice of your health care provider. Copyright   2023 [employer]. All rights reserved. Clinically reviewed by the [facility] Transitions Program. [employer] [id number] - REV 01/24.

## 2024-02-23 NOTE — PROGRESS NOTES
Preventive Care Visit  [facility] BELEM Santamaria PA-C, Family Medicine  Feb 23, 2024    Assessment & Plan     Encounter for Medicare annual wellness exam      Mixed hyperlipidemia  Will recheck labs. Has been stable.   - BASIC METABOLIC PANEL; Future  - atorvastatin (LIPITOR) 20 MG tablet; Take 1 tablet (20 mg) by mouth daily  - Lipid panel reflex to direct LDL Non-fasting; Future  - ALT; Future  - BASIC METABOLIC PANEL  - Lipid panel reflex to direct LDL Non-fasting  - ALT    Age-related osteoporosis without current pathological fracture  Patient elects not to take medication at this time. Will continue her calcium and vitamin D and exercise program. Follow up DEXA as planned in 2 years.               Counseling  Appropriate preventive services were discussed with this patient, including applicable screening as appropriate for fall prevention, nutrition, physical activity, Tobacco-use cessation, weight loss and cognition.  Checklist reviewing preventive services available has been given to the patient.  Reviewed patient's diet, addressing concerns and/or questions.           Gómez Weaver is a 68 year old, presenting for the following:  Wellness Visit and Health Maintenance        2/23/2024     7:48 AM   Additional Questions   Roomed by naima lerner         Health Care Directive  Patient does not have a Health Care Directive or Living Will: Patient states has Advance Directive and will bring in a copy to clinic.    HPI  Osteoporosis-patient does not want medication- started at home exercise program. Taking her calcium regularly.   Lipids-tolerating statin well.     126/74 this am  125/67 2/22/24  114/73 2/21/24 2/20/2024   General Health   How would you rate your overall physical health? Good   Feel stress (tense, anxious, or unable to sleep) Not at all         2/20/2024   Nutrition   Diet: Regular (no restrictions)         2/20/2024   Exercise   Days per week of  moderate/strenous exercise 6 days   Average minutes spent exercising at this level 30 min         2/20/2024   Social Factors   Frequency of gathering with friends or relatives Once a week   Worry food won't last until get money to buy more No   Food not last or not have enough money for food? No   Do you have housing?  Yes   Are you worried about losing your housing? No   Lack of transportation? No   Unable to get utilities (heat,electricity)? No         2/20/2024   Fall Risk   Fallen 2 or more times in the past year? No    No   Trouble with walking or balance? No    No          2/20/2024   Activities of Daily Living- Home Safety   Needs help with the following daily activites None of the above   Safety concerns in the home None of the above         2/20/2024   Dental   Dentist two times every year? Yes         2/20/2024   Hearing Screening   Hearing concerns? None of the above         2/20/2024   Driving Risk Screening   Patient/family members have concerns about driving No         2/20/2024   General Alertness/Fatigue Screening   Have you been more tired than usual lately? No         2/20/2024   Urinary Incontinence Screening   Bothered by leaking urine in past 6 months No         2/20/2024   TB Screening   Were you born outside of [place]?  No         Today's PHQ-2 Score:       2/23/2024     4:54 AM   PHQ-2 ( 1999 [employer])   Q1: Little interest or pleasure in doing things 0   Q2: Feeling down, depressed or hopeless 0   PHQ-2 Score 0   Q1: Little interest or pleasure in doing things Not at all   Q2: Feeling down, depressed or hopeless Not at all   PHQ-2 Score 0           2/20/2024   Substance Use   Alcohol more than 3/day or more than 7/wk No   Do you have a current opioid prescription? No   How severe/bad is pain from 1 to 10? 0/10 (No Pain)   Do you use any other substances recreationally? No     Social History     Tobacco Use    Smoking status: Never    Smokeless tobacco: Never   Vaping Use    Vaping Use: Never used    Substance Use Topics    Alcohol use: Yes     Comment: Socially    Drug use: No           2/19/2023   LAST FHS-7 RESULTS   1st degree relative breast or ovarian cancer No   Any relative bilateral breast cancer No   Any male have breast cancer No   Any woman have breast and ovarian cancer No   Any woman with breast cancer before 50yrs No   2 or more relatives with breast and/or ovarian cancer No   2 or more relatives with breast and/or bowel cancer No        Mammogram Screening - Mammogram every 1-2 years updated in Health Maintenance based on mutual decision making    ASCVD Risk   The 10-year ASCVD risk score (Sacha CRISOSTOMO, et al., 2019) is: 11.7%    Values used to calculate the score:      Age: 68 years      Sex: Female      Is Non- : No      Diabetic: No      Tobacco smoker: No      Systolic Blood Pressure: 152 mmHg      Is BP treated: No      HDL Cholesterol: 74 mg/dL      Total Cholesterol: 168 mg/dL            Reviewed and updated as needed this visit by Provider   Tobacco  Allergies  Meds  Problems  Med Hx  Surg Hx  Fam Hx              Current providers sharing in care for this patient include:  Patient Care Team:  Meghan Santamaria PA-C as PCP - General (Family Medicine)  Tara Hong RD as Registered Dietitian (Dietitian, Registered)  Meghan Santamaria PA-C as Assigned PCP    The following health maintenance items are reviewed in Epic and correct as of today:  Health Maintenance   Topic Date Due    RSV VACCINE (Pregnancy & 60+) (1 - 1-dose 60+ series) Never done    ANNUAL REVIEW OF HM ORDERS  07/02/2022    BMP  02/22/2024    MEDICARE ANNUAL WELLNESS VISIT  02/22/2024    LIPID  05/25/2024    FALL RISK ASSESSMENT  02/23/2025    DEXA  07/25/2025    MAMMO SCREENING  07/25/2025    GLUCOSE  02/22/2026    ADVANCE CARE PLANNING  02/22/2028    COLORECTAL CANCER SCREENING  05/02/2030    DTAP/TDAP/TD IMMUNIZATION (3 - Td or Tdap) 07/02/2031    HEPATITIS C SCREENING  Completed     "PHQ-2 (once per calendar year)  Completed    INFLUENZA VACCINE  Completed    Pneumococcal Vaccine: 65+ Years  Completed    ZOSTER IMMUNIZATION  Completed    COVID-19 Vaccine  Completed    IPV IMMUNIZATION  Aged Out    HPV IMMUNIZATION  Aged Out    MENINGITIS IMMUNIZATION  Aged Out    RSV MONOCLONAL ANTIBODY  Aged Out            Objective    Exam  BP (!) 152/82   Pulse 94   Temp 98  F (36.7  C) (Oral)   Resp 25   Ht 1.594 m (5' 2.76\")   Wt 57.2 kg (126 lb)   SpO2 100%   BMI 22.49 kg/m     Estimated body mass index is 22.49 kg/m  as calculated from the following:    Height as of this encounter: 1.594 m (5' 2.76\").    Weight as of this encounter: 57.2 kg (126 lb).    Physical Exam  GENERAL: alert and no distress  EYES: Eyes grossly normal to inspection, PERRL and conjunctivae and sclerae normal  HENT: ear canals and TM's normal, nose and mouth without ulcers or lesions  NECK: no adenopathy, no asymmetry, masses, or scars  RESP: lungs clear to auscultation - no rales, rhonchi or wheezes  CV: regular rate and rhythm, normal S1 S2, no S3 or S4, no murmur, click or rub, no peripheral edema  ABDOMEN: soft, nontender, no hepatosplenomegaly, no masses   MS: no gross musculoskeletal defects noted, no edema  SKIN: no suspicious lesions or rashes  NEURO: Normal strength and tone, mentation intact and speech normal  PSYCH: mentation appears normal, affect normal/bright        2/23/2024   Mini Cog   Clock Draw Score 2 Normal   3 Item Recall 3 objects recalled   Mini Cog Total Score 5            Signed Electronically by: Meghan Santamaria PA-C    "

## 2025-02-19 DIAGNOSIS — E78.2 MIXED HYPERLIPIDEMIA: ICD-10-CM

## 2025-02-19 RX ORDER — ATORVASTATIN CALCIUM 20 MG/1
20 TABLET, FILM COATED ORAL DAILY
Qty: 90 TABLET | Refills: 0 | Status: SHIPPED | OUTPATIENT
Start: 2025-02-19

## 2025-02-28 SDOH — HEALTH STABILITY: PHYSICAL HEALTH: ON AVERAGE, HOW MANY DAYS PER WEEK DO YOU ENGAGE IN MODERATE TO STRENUOUS EXERCISE (LIKE A BRISK WALK)?: 4 DAYS

## 2025-02-28 SDOH — HEALTH STABILITY: PHYSICAL HEALTH: ON AVERAGE, HOW MANY MINUTES DO YOU ENGAGE IN EXERCISE AT THIS LEVEL?: 40 MIN

## 2025-02-28 ASSESSMENT — SOCIAL DETERMINANTS OF HEALTH (SDOH): HOW OFTEN DO YOU GET TOGETHER WITH FRIENDS OR RELATIVES?: ONCE A WEEK

## 2025-03-03 ENCOUNTER — OFFICE VISIT (OUTPATIENT)
Dept: FAMILY MEDICINE | Facility: CLINIC | Age: 70
End: 2025-03-03
Payer: COMMERCIAL

## 2025-03-03 VITALS
DIASTOLIC BLOOD PRESSURE: 82 MMHG | TEMPERATURE: 97.8 F | OXYGEN SATURATION: 100 % | RESPIRATION RATE: 16 BRPM | SYSTOLIC BLOOD PRESSURE: 142 MMHG | WEIGHT: 124 LBS | HEART RATE: 84 BPM | BODY MASS INDEX: 21.97 KG/M2 | HEIGHT: 63 IN

## 2025-03-03 DIAGNOSIS — Z00.00 ENCOUNTER FOR MEDICARE ANNUAL WELLNESS EXAM: Primary | ICD-10-CM

## 2025-03-03 DIAGNOSIS — R73.09 ELEVATED GLUCOSE: ICD-10-CM

## 2025-03-03 DIAGNOSIS — R03.0 ELEVATED BP WITHOUT DIAGNOSIS OF HYPERTENSION: ICD-10-CM

## 2025-03-03 DIAGNOSIS — E78.2 MIXED HYPERLIPIDEMIA: ICD-10-CM

## 2025-03-03 LAB
ALT SERPL W P-5'-P-CCNC: 22 U/L (ref 0–50)
ANION GAP SERPL CALCULATED.3IONS-SCNC: 11 MMOL/L (ref 7–15)
BUN SERPL-MCNC: 11.3 MG/DL (ref 8–23)
CALCIUM SERPL-MCNC: 9.7 MG/DL (ref 8.8–10.4)
CHLORIDE SERPL-SCNC: 104 MMOL/L (ref 98–107)
CHOLEST SERPL-MCNC: 176 MG/DL
CREAT SERPL-MCNC: 0.77 MG/DL (ref 0.51–0.95)
EGFRCR SERPLBLD CKD-EPI 2021: 82 ML/MIN/1.73M2
EST. AVERAGE GLUCOSE BLD GHB EST-MCNC: 108 MG/DL
FASTING STATUS PATIENT QL REPORTED: YES
FASTING STATUS PATIENT QL REPORTED: YES
GLUCOSE SERPL-MCNC: 103 MG/DL (ref 70–99)
HBA1C MFR BLD: 5.4 % (ref 0–5.6)
HCO3 SERPL-SCNC: 26 MMOL/L (ref 22–29)
HDLC SERPL-MCNC: 76 MG/DL
LDLC SERPL CALC-MCNC: 70 MG/DL
NONHDLC SERPL-MCNC: 100 MG/DL
POTASSIUM SERPL-SCNC: 4 MMOL/L (ref 3.4–5.3)
SODIUM SERPL-SCNC: 141 MMOL/L (ref 135–145)
TRIGL SERPL-MCNC: 148 MG/DL

## 2025-03-03 PROCEDURE — 3079F DIAST BP 80-89 MM HG: CPT | Performed by: PHYSICIAN ASSISTANT

## 2025-03-03 PROCEDURE — G0439 PPPS, SUBSEQ VISIT: HCPCS | Performed by: PHYSICIAN ASSISTANT

## 2025-03-03 PROCEDURE — 84460 ALANINE AMINO (ALT) (SGPT): CPT | Performed by: PHYSICIAN ASSISTANT

## 2025-03-03 PROCEDURE — 36415 COLL VENOUS BLD VENIPUNCTURE: CPT | Performed by: PHYSICIAN ASSISTANT

## 2025-03-03 PROCEDURE — 80061 LIPID PANEL: CPT | Performed by: PHYSICIAN ASSISTANT

## 2025-03-03 PROCEDURE — 83036 HEMOGLOBIN GLYCOSYLATED A1C: CPT | Performed by: PHYSICIAN ASSISTANT

## 2025-03-03 PROCEDURE — 3077F SYST BP >= 140 MM HG: CPT | Performed by: PHYSICIAN ASSISTANT

## 2025-03-03 PROCEDURE — 80048 BASIC METABOLIC PNL TOTAL CA: CPT | Performed by: PHYSICIAN ASSISTANT

## 2025-03-03 RX ORDER — ATORVASTATIN CALCIUM 20 MG/1
20 TABLET, FILM COATED ORAL DAILY
Qty: 90 TABLET | Refills: 0 | Status: SHIPPED | OUTPATIENT
Start: 2025-03-03 | End: 2025-03-03

## 2025-03-03 RX ORDER — ATORVASTATIN CALCIUM 20 MG/1
20 TABLET, FILM COATED ORAL DAILY
Qty: 90 TABLET | Refills: 3 | Status: SHIPPED | OUTPATIENT
Start: 2025-03-03

## 2025-03-03 NOTE — PATIENT INSTRUCTIONS
Check blood pressure 3-4 times per week and send Meghan a [employer] message with the readings.     Schedule DEXA and mammogram after 7/25/25    Ok to get another COVID shot after 4/30/25.     Patient Education   Preventive Care Advice   This is general advice given by our system to help you stay healthy. However, your care team may have specific advice just for you. Please talk to your care team about your preventive care needs.  Nutrition  Eat 5 or more servings of fruits and vegetables each day.  Try wheat bread, brown rice and whole grain pasta (instead of white bread, rice, and pasta).  Get enough calcium and vitamin D. Check the label on foods and aim for 100% of the RDA (recommended daily allowance).  Lifestyle  Exercise at least 150 minutes each week  (30 minutes a day, 5 days a week).  Do muscle strengthening activities 2 days a week. These help control your weight and prevent disease.  No smoking.  Wear sunscreen to prevent skin cancer.  Have a dental exam and cleaning every 6 months.  Yearly exams  See your health care team every year to talk about:  Any changes in your health.  Any medicines your care team has prescribed.  Preventive care, family planning, and ways to prevent chronic diseases.  Shots (vaccines)   HPV shots (up to age 26), if you've never had them before.  Hepatitis B shots (up to age 59), if you've never had them before.  COVID-19 shot: Get this shot when it's due.  Flu shot: Get a flu shot every year.  Tetanus shot: Get a tetanus shot every 10 years.  Pneumococcal, hepatitis A, and RSV shots: Ask your care team if you need these based on your risk.  Shingles shot (for age 50 and up)  General health tests  Diabetes screening:  Starting at age 35, Get screened for diabetes at least every 3 years.  If you are younger than age 35, ask your care team if you should be screened for diabetes.  Cholesterol test: At age 39, start having a cholesterol test every 5 years, or more often if advised.  Bone  density scan (DEXA): At age 50, ask your care team if you should have this scan for osteoporosis (brittle bones).  Hepatitis C: Get tested at least once in your life.  STIs (sexually transmitted infections)  Before age 24: Ask your care team if you should be screened for STIs.  After age 24: Get screened for STIs if you're at risk. You are at risk for STIs (including HIV) if:  You are sexually active with more than one person.  You don't use condoms every time.  You or a partner was diagnosed with a sexually transmitted infection.  If you are at risk for HIV, ask about PrEP medicine to prevent HIV.  Get tested for HIV at least once in your life, whether you are at risk for HIV or not.  Cancer screening tests  Cervical cancer screening: If you have a cervix, begin getting regular cervical cancer screening tests starting at age 21.  Breast cancer scan (mammogram): If you've ever had breasts, begin having regular mammograms starting at age 40. This is a scan to check for breast cancer.  Colon cancer screening: It is important to start screening for colon cancer at age 45.  Have a colonoscopy test every 10 years (or more often if you're at risk) Or, ask your provider about stool tests like a FIT test every year or Cologuard test every 3 years.  To learn more about your testing options, visit:   .  For help making a decision, visit:   https://bit.ly/kk16519.  Prostate cancer screening test: If you have a prostate, ask your care team if a prostate cancer screening test (PSA) at age 55 is right for you.  Lung cancer screening: If you are a current or former smoker ages 50 to 80, ask your care team if ongoing lung cancer screenings are right for you.  For informational purposes only. Not to replace the advice of your health care provider. Copyright   2023 [employer]. All rights reserved. Clinically reviewed by the [facility] Transitions Program. Locaid 461698 - REV 01/24.

## 2025-03-03 NOTE — PROGRESS NOTES
Preventive Care Visit  [facility] BELEM Santamaria PA-C, Family Medicine  Mar 3, 2025      Assessment & Plan     Encounter for Medicare annual wellness exam    Mixed hyperlipidemia  Will recheck with labs and refill.   - BASIC METABOLIC PANEL; Future  - Lipid panel reflex to direct LDL Non-fasting; Future  - ALT; Future  - atorvastatin (LIPITOR) 20 MG tablet; Take 1 tablet (20 mg) by mouth daily.    Elevated glucose  Borderline last year. Recheck this year.   - Hemoglobin A1c; Future    Elevated blood pressure  Patient will check at home and mychart. May need to start medications if high.         The longitudinal plan of care for the diagnosis(es)/condition(s) as documented were addressed during this visit. Due to the added complexity in care, I will continue to support Meg in the subsequent management and with ongoing continuity of care.    Counseling  Appropriate preventive services were addressed with this patient via screening, questionnaire, or discussion as appropriate for fall prevention, nutrition, physical activity, Tobacco-use cessation, social engagement, weight loss and cognition.  Checklist reviewing preventive services available has been given to the patient.  Reviewed patient's diet, addressing concerns and/or questions.           Gómez Weaver is a 69 year old, presenting for the following:  Wellness Visit (Medicare wellness)        3/3/2025     8:03 AM   Additional Questions   Roomed by gagan MCBRIDE           Advance Care Planning  Patient does not have a Health Care Directive: Discussed advance care planning with patient; however, patient declined at this time.      2/28/2025   General Health   How would you rate your overall physical health? Good   Feel stress (tense, anxious, or unable to sleep) Only a little   (!) STRESS CONCERN      2/28/2025   Nutrition   Diet: Regular (no restrictions)         2/28/2025   Exercise   Days per week of moderate/strenous  exercise 4 days   Average minutes spent exercising at this level 40 min         2/28/2025   Social Factors   Frequency of gathering with friends or relatives Once a week   Worry food won't last until get money to buy more No   Food not last or not have enough money for food? No   Do you have housing? (Housing is defined as stable permanent housing and does not include staying ouside in a car, in a tent, in an abandoned building, in an overnight shelter, or couch-surfing.) Yes   Are you worried about losing your housing? No   Lack of transportation? No   Unable to get utilities (heat,electricity)? No         2/28/2025   Fall Risk   Fallen 2 or more times in the past year? No   Trouble with walking or balance? No          2/28/2025   Activities of Daily Living- Home Safety   Needs help with the following daily activites None of the above   Safety concerns in the home None of the above         2/28/2025   Dental   Dentist two times every year? Yes         2/28/2025   Hearing Screening   Hearing concerns? None of the above         2/28/2025   Driving Risk Screening   Patient/family members have concerns about driving No         2/28/2025   General Alertness/Fatigue Screening   Have you been more tired than usual lately? No         2/28/2025   Urinary Incontinence Screening   Bothered by leaking urine in past 6 months No         2/20/2024   TB Screening   Were you born outside of the [place]? No         Today's PHQ-2 Score:       3/2/2025     2:24 PM   PHQ-2 ( 1999 Pfizer)   Q1: Little interest or pleasure in doing things 0   Q2: Feeling down, depressed or hopeless 0   PHQ-2 Score 0    Q1: Little interest or pleasure in doing things Not at all   Q2: Feeling down, depressed or hopeless Not at all   PHQ-2 Score 0       Patient-reported           2/28/2025   Substance Use   Alcohol more than 3/day or more than 7/wk No   Do you have a current opioid prescription? No   How severe/bad is pain from 1 to 10? 0/10 (No Pain)   Do you  use any other substances recreationally? No     Social History     Tobacco Use    Smoking status: Never    Smokeless tobacco: Never   Vaping Use    Vaping status: Never Used   Substance Use Topics    Alcohol use: Yes     Comment: Socially    Drug use: No           7/25/2023   LAST FHS-7 RESULTS   1st degree relative breast or ovarian cancer No   Any relative bilateral breast cancer No   Any male have breast cancer No   Any ONE woman have BOTH breast AND ovarian cancer No   Any woman with breast cancer before 50yrs No   2 or more relatives with breast AND/OR ovarian cancer No   2 or more relatives with breast AND/OR bowel cancer No        Mammogram Screening - Mammogram every 1-2 years updated in Health Maintenance based on mutual decision making    ASCVD Risk   The 10-year ASCVD risk score (Sacha CRISOSTOMO, et al., 2019) is: 14.2%    Values used to calculate the score:      Age: 69 years      Sex: Female      Is Non- : No      Diabetic: No      Tobacco smoker: No      Systolic Blood Pressure: 158 mmHg      Is BP treated: No      HDL Cholesterol: 79 mg/dL      Total Cholesterol: 175 mg/dL            Reviewed and updated as needed this visit by Provider   Tobacco  Allergies  Meds  Problems  Med Hx  Surg Hx  Fam Hx              Current providers sharing in care for this patient include:  Patient Care Team:  Meghan Santamaria PA-C as PCP - General (Family Medicine)  Tara Hong RD as Registered Dietitian (Dietitian, Registered)  Meghan Santamaria PA-C as Assigned PCP    The following health maintenance items are reviewed in Epic and correct as of today:  Health Maintenance   Topic Date Due    ANNUAL REVIEW OF HM ORDERS  07/02/2022    BMP  02/23/2025    LIPID  02/23/2025    COVID-19 Vaccine (10 - 2024-25 season) 04/30/2025    DEXA  07/25/2025    MAMMO SCREENING  07/25/2025    MEDICARE ANNUAL WELLNESS VISIT  03/03/2026    FALL RISK ASSESSMENT  03/03/2026    GLUCOSE  02/23/2027    RSV  "VACCINE (1 - 1-dose 75+ series) 11/24/2028    ADVANCE CARE PLANNING  02/23/2029    COLORECTAL CANCER SCREENING  05/02/2030    DTAP/TDAP/TD IMMUNIZATION (3 - Td or Tdap) 07/02/2031    HEPATITIS C SCREENING  Completed    PHQ-2 (once per calendar year)  Completed    INFLUENZA VACCINE  Completed    Pneumococcal Vaccine: 50+ Years  Completed    ZOSTER IMMUNIZATION  Completed    HPV IMMUNIZATION  Aged Out    MENINGITIS IMMUNIZATION  Aged Out            Objective    Exam  BP (!) 158/84 (BP Location: Left arm, Patient Position: Sitting, Cuff Size: Adult Regular)   Pulse 84   Temp 97.8  F (36.6  C) (Temporal)   Resp 16   Ht 1.588 m (5' 2.5\")   Wt 56.2 kg (124 lb)   SpO2 100%   BMI 22.32 kg/m     Estimated body mass index is 22.32 kg/m  as calculated from the following:    Height as of this encounter: 1.588 m (5' 2.5\").    Weight as of this encounter: 56.2 kg (124 lb).    Physical Exam  GENERAL: alert and no distress  EYES: Eyes grossly normal to inspection, PERRL and conjunctivae and sclerae normal  HENT: ear canals and TM's normal, nose and mouth without ulcers or lesions  NECK: no adenopathy, no asymmetry, masses, or scars  RESP: lungs clear to auscultation - no rales, rhonchi or wheezes  CV: regular rate and rhythm, normal S1 S2, no S3 or S4, no murmur, click or rub, no peripheral edema  ABDOMEN: soft, nontender, no hepatosplenomegaly, no masses   MS: no gross musculoskeletal defects noted, no edema  SKIN: no suspicious lesions or rashes  NEURO: Normal strength and tone, mentation intact and speech normal  PSYCH: mentation appears normal, affect normal/bright        3/3/2025   Mini Cog   Clock Draw Score 2 Normal   3 Item Recall 3 objects recalled   Mini Cog Total Score 5              Signed Electronically by: Meghan Santamaria PA-C    "

## 2025-06-30 ENCOUNTER — PATIENT OUTREACH (OUTPATIENT)
Dept: CARE COORDINATION | Facility: CLINIC | Age: 70
End: 2025-06-30
Payer: COMMERCIAL

## 2025-07-08 ENCOUNTER — OFFICE VISIT (OUTPATIENT)
Dept: FAMILY MEDICINE | Facility: CLINIC | Age: 70
End: 2025-07-08
Payer: COMMERCIAL

## 2025-07-08 VITALS
WEIGHT: 119.6 LBS | DIASTOLIC BLOOD PRESSURE: 82 MMHG | SYSTOLIC BLOOD PRESSURE: 164 MMHG | RESPIRATION RATE: 16 BRPM | HEART RATE: 79 BPM | BODY MASS INDEX: 21.19 KG/M2 | OXYGEN SATURATION: 100 % | HEIGHT: 63 IN | TEMPERATURE: 98 F

## 2025-07-08 DIAGNOSIS — Z01.818 PREOP GENERAL PHYSICAL EXAM: Primary | ICD-10-CM

## 2025-07-08 DIAGNOSIS — R73.09 ELEVATED GLUCOSE: ICD-10-CM

## 2025-07-08 DIAGNOSIS — H25.9 AGE-RELATED CATARACT OF BOTH EYES, UNSPECIFIED AGE-RELATED CATARACT TYPE: ICD-10-CM

## 2025-07-08 DIAGNOSIS — E78.2 MIXED HYPERLIPIDEMIA: ICD-10-CM

## 2025-07-08 PROCEDURE — 3078F DIAST BP <80 MM HG: CPT | Performed by: PHYSICIAN ASSISTANT

## 2025-07-08 PROCEDURE — 3077F SYST BP >= 140 MM HG: CPT | Performed by: PHYSICIAN ASSISTANT

## 2025-07-08 PROCEDURE — 99214 OFFICE O/P EST MOD 30 MIN: CPT | Performed by: PHYSICIAN ASSISTANT

## 2025-07-08 NOTE — PATIENT INSTRUCTIONS
How to Take Your Medication Before Surgery  Preoperative Medication Instructions   Antiplatelet or Anticoagulation Medication Instructions   - We reviewed the medication list and the patient is not on an antiplatelet or anticoagulation medications.    Additional Medication Instructions  Take all scheduled medications on the day of surgery       Patient Education   Preparing for Your Surgery  For Adults  Getting started  In most cases, a nurse will call to review your health history and instructions. They will give you an arrival time based on your scheduled surgery time. Please be ready to share:  Your doctor's clinic name and phone number  Your medical, surgical, and anesthesia history  A list of allergies and sensitivities  A list of medicines, including herbal treatments and over-the-counter drugs  Whether the patient has a legal guardian (ask how to send us the papers in advance)  Note: You may not receive a call if you were seen at our PAC (Preoperative Assessment Center).  Please tell us if you're pregnant--or if there's any chance you might be pregnant. Some surgeries may injure a fetus (unborn baby), so they require a pregnancy test. Surgeries that are safe for a fetus don't always need a test, and you can choose whether to have one.   Preparing for surgery  Within 10 to 30 days of surgery: Have a pre-op exam (sometimes called an H&P, or History and Physical). This can be done at a clinic or pre-operative center.  If you're having a , you may not need this exam. Talk to your care team.  At your pre-op exam, talk to your care team about all medicines you take. (This includes CBD oil and any drugs, such as THC, marijuana, and other forms of cannabis.) If you need to stop any medicine before surgery, ask when to start taking it again.  This is for your safety. Many medicines and drugs can make you bleed too much during surgery. Some change how well surgery (anesthesia) drugs work.  Call your insurance  company to let them know you're having surgery. (If you don't have insurance, call [phone].)  Call your clinic if there's any change in your health. This includes a scrape or scratch near the surgery site, or any signs of a cold (sore throat, runny nose, cough, rash, fever).  Eating and drinking guidelines  For your safety: Unless your surgeon tells you otherwise, follow the guidelines below.  Eat and drink as normal until 8 hours before you arrive for surgery. After that, no food or milk. You can spit out gum when you arrive.  Drink clear liquids until 2 hours before you arrive. These are liquids you can see through, like water, Gatorade, and Propel Water. They also include plain black coffee and tea (no cream or milk).  No alcohol for 24 hours before you arrive. The night before surgery, stop any drinks that contain THC.  If your care team tells you to take medicine on the morning of surgery, it's okay to take it with a sip of water. No other medicines or drugs are allowed (including CBD oil)--follow your care team's instructions.  If you have questions the day of surgery, call your hospital or surgery center.   Preventing infection  Shower or bathe the night before and the morning of surgery. Follow the instructions your clinic gave you. (If no instructions, use regular soap.)  Don't shave or clip hair near your surgery site. We'll remove the hair if needed.  Don't smoke or vape the morning of surgery. No chewing tobacco for 6 hours before you arrive. A nicotine patch is okay. You may spit out nicotine gum when you arrive.  For some surgeries, the surgeon will tell you to fully quit smoking and nicotine.  We will make every effort to keep you safe from infection. We will:  Clean our hands often with soap and water (or an alcohol-based hand rub).  Clean the skin at your surgery site with a special soap that kills germs.  Give you a special gown to keep you warm. (Cold raises the risk of infection.)  Wear hair  covers, masks, gowns, and gloves during surgery.  Give antibiotic medicine, if prescribed. Not all surgeries need this medicine.  What to bring on the day of surgery  Photo ID and insurance card  Copy of your health care directive, if you have one  Glasses and hearing aids (bring cases)  You can't wear contacts during surgery  Inhaler and eye drops, if you use them (tell us about these when you arrive)  CPAP machine or breathing device, if you use them  A few personal items, if spending the night  If you have . . .  A pacemaker, ICD (cardiac defibrillator), or other implant: Bring the ID card.  An implanted stimulator: Bring the remote control.  A legal guardian: Bring a copy of the certified (court-stamped) guardianship papers.  Please remove any jewelry, including body piercings. Leave jewelry and other valuables at home.  If you're going home the day of surgery  You must have a support person drive you home. They should stay with you overnight, and they may need to help with your self-care.  If you don't have a support person, please tells us as soon as possible. We can help.  After surgery  If it's hard to control your pain or you need more pain medicine, please call your surgeon's office.  Questions?   If you have any questions for your care team, list them here:   ____________________________________________________________________________________________________________________________________________________________________________________________________________________________________________________________  For informational purposes only. Not to replace the advice of your health care provider. Copyright   2003, 2019 [facility]. All rights reserved. Clinically reviewed by iKke Quintana MD. [employer] [id number] - REV 02/25.

## 2025-07-08 NOTE — PROGRESS NOTES
Preoperative Evaluation  [facility]  [address]  [place] [address]  Phone: [phone]  Primary Provider: Meghan Santamaria PA-C  Pre-op Performing Provider: Meghan Santamaria PA-C  Jul 8, 2025             7/3/2025   Surgical Information   What procedure is being done? Cataracts Surgery   Facility or Hospital where procedure/surgery will be performed: [facility]   Who is doing the procedure / surgery? Dr. Patrick J. Riedel   Date of surgery / procedure: July 22 and Aug 5 2025   Time of surgery / procedure: TBD   Where do you plan to recover after surgery? at home with family     Fax number for surgical facility: [phone]    Assessment & Plan     The proposed surgical procedure is considered LOW risk.    Preop general physical exam  Age-related cataract of both eyes, unspecified age-related cataract type  Elevated glucose  Mixed hyperlipidemia         Risks and Recommendations  The patient has the following additional risks and recommendations for perioperative complications:   - No identified additional risk factors other than previously addressed    Preoperative Medication Instructions  Antiplatelet or Anticoagulation Medication Instructions   - We reviewed the medication list and the patient is not on an antiplatelet or anticoagulation medications.    Additional Medication Instructions  Take all scheduled medications on the day of surgery    Recommendation  Approval given to proceed with proposed procedure, without further diagnostic evaluation.        Gómez Weaver is a 69 year old, presenting for the following:  Pre-Op Exam     Home blood pressure 134/73 this morning. Historically high in clinic but normal at home.       7/8/2025    11:30 AM   Additional Questions   Roomed by Sharmila MCBRIDE: Bilateral cataract removal. Left eye is being done first and right eye to follow. Pt is excited to see again.          7/3/2025   Pre-Op Questionnaire   Have you ever had  a heart attack or stroke? No   Have you ever had surgery on your heart or blood vessels, such as a stent placement, a coronary artery bypass, or surgery on an artery in your head, neck, heart, or legs? No   Do you have chest pain with activity? No   Do you have a history of heart failure? No   Do you currently have a cold, bronchitis or symptoms of other infection? No   Do you have a cough, shortness of breath, or wheezing? No   Do you or anyone in your family have previous history of blood clots? No   Do you or does anyone in your family have a serious bleeding problem such as prolonged bleeding following surgeries or cuts? No   Have you ever had problems with anemia or been told to take iron pills? No   Have you had any abnormal blood loss such as black, tarry or bloody stools, or abnormal vaginal bleeding? No   Have you ever had a blood transfusion? No   Are you willing to have a blood transfusion if it is medically needed before, during, or after your surgery? Yes   Have you or any of your relatives ever had problems with anesthesia? No   Do you have sleep apnea, excessive snoring or daytime drowsiness? No   Do you have any artifical heart valves or other implanted medical devices like a pacemaker, defibrillator, or continuous glucose monitor? No   Do you have artificial joints? No   Are you allergic to latex? (!) YES     Advance Care Planning    Document on file is a Health Care Directive or POLST.    Preoperative Review of    reviewed - no record of controlled substances prescribed.        Patient Active Problem List    Diagnosis Date Noted    Elevated glucose 03/03/2025     Priority: Medium    Diverticulitis of colon with perforation 12/12/2018     Priority: Medium     Hospitalized at [facility] 12/12/18-12/14/18      Age-related osteoporosis without current pathological fracture 08/03/2018     Priority: Medium    Functional diarrhea 06/06/2018     Priority: Medium    Mixed hyperlipidemia  01/02/2018     Priority: Medium    Midline cystocele 01/02/2018     Priority: Medium      Past Medical History:   Diagnosis Date    Age-related osteoporosis without current pathological fracture      Past Surgical History:   Procedure Laterality Date    COLONOSCOPY N/A 1/17/2018    Procedure: COMBINED COLONOSCOPY, SINGLE OR MULTIPLE BIOPSY/POLYPECTOMY BY BIOPSY;;  Surgeon: Curly Laura DO;  Location: [facility] OR    COLONOSCOPY N/A 5/2/2023    Procedure: COLONOSCOPY, WITH POLYPECTOMY AND BIOPSY;  Surgeon: Ksenia Proctor DO;  Location: [facility] OR    COLONOSCOPY WITH CO2 INSUFFLATION N/A 1/17/2018    Procedure: COLONOSCOPY WITH CO2 INSUFFLATION;  COLON-DIARRHEA,WEIGHT LOSS/ VANDER WAL;  Surgeon: Curly Laura DO;  Location: [facility] OR    COLONOSCOPY WITH CO2 INSUFFLATION N/A 2/15/2019    Procedure: COLONOSCOPY WITH CO2 INSUFFLATION;  Surgeon: Shawn Corona MD;  Location: [facility] OR    COLONOSCOPY WITH CO2 INSUFFLATION N/A 5/2/2023    Procedure: Colonoscopy with CO2 insufflation;  Surgeon: Ksenia Proctor DO;  Location: [facility] OR    DILATION AND CURETTAGE  1977    GYN SURGERY      D & C    ORTHOPEDIC SURGERY Left 09/2014    meniscus repair     Current Outpatient Medications   Medication Sig Dispense Refill    atorvastatin (LIPITOR) 20 MG tablet Take 1 tablet (20 mg) by mouth daily. 90 tablet 3    calcium carbonate (OS-LAURI 500 MG [place]. CA) 1250 MG tablet Take 1 tablet by mouth 2 times daily      melatonin 1 MG CAPS Take 2 mg by mouth At Bedtime      Multiple Vitamin (MULTI VITAMIN PO) With biotin         Allergies   Allergen Reactions    Fosamax [Alendronate]      mild tingling and swelling of the lips, itchiness/tingling in my nose, and overall itchiness of the scalp, neck, and ears, along with a mild feeling of being short of breath    Latex Rash        Social History     Tobacco Use    Smoking status: Never    Smokeless tobacco: Never   Substance Use Topics    Alcohol use: Yes     Comment: Socially     Family History  "  Problem Relation Age of Onset    Osteoporosis Mother     Rheumatoid Arthritis Mother     Dementia Mother     Cerebrovascular Disease Father     Hypertension Maternal Grandmother     Emphysema Maternal Grandfather     Coronary Artery Disease Paternal Grandmother     Alzheimer Disease Brother     Crohn's Disease Brother      History   Drug Use No             Review of Systems  Constitutional, neuro, ENT, endocrine, pulmonary, cardiac, gastrointestinal, genitourinary, musculoskeletal, integument and psychiatric systems are negative, except as otherwise noted.    Objective    BP (!) 164/82   Pulse 79   Temp 98  F (36.7  C) (Temporal)   Resp 16   Ht 1.588 m (5' 2.5\")   Wt 54.3 kg (119 lb 9.6 oz)   SpO2 100%   BMI 21.53 kg/m     Estimated body mass index is 21.53 kg/m  as calculated from the following:    Height as of this encounter: 1.588 m (5' 2.5\").    Weight as of this encounter: 54.3 kg (119 lb 9.6 oz).  Physical Exam  GENERAL: alert and no distress  NECK: no adenopathy, no asymmetry, masses, or scars  RESP: lungs clear to auscultation - no rales, rhonchi or wheezes  CV: regular rate and rhythm, normal S1 S2, no S3 or S4, no murmur, click or rub, no peripheral edema  MS: no gross musculoskeletal defects noted, no edema  Neuro: CN II-XII grossly intact, deep tendon reflexes intact.   Psych: mood normal, affect tearful at times.     Recent Labs   Lab Test 03/03/25  0837      POTASSIUM 4.0   CR 0.77   A1C 5.4        Diagnostics  No labs were ordered during this visit.   No EKG required for low risk surgery (cataract, skin procedure, breast biopsy, etc).    Revised Cardiac Risk Index (RCRI)  The patient has the following serious cardiovascular risks for perioperative complications:   - No serious cardiac risks = 0 points     RCRI Interpretation: 0 points: Class I (very low risk - 0.4% complication rate)         Signed Electronically by: Meghan Santamaria PA-C  A copy of this evaluation report is provided to " the requesting physician.       The student MELANIE Pablo acted as a scribe and the encounter documented above was completely performed by myself and the documentation reflects the work I have performed today.   Meghan Santamaria PA-C

## (undated) DEVICE — KIT ENDO FIRST STEP DISINFECTANT 200ML W/POUCH EP-4

## (undated) DEVICE — PREP CHLORAPREP 26ML TINTED ORANGE  260815

## (undated) DEVICE — SOL WATER IRRIG 1000ML BOTTLE 07139-09

## (undated) DEVICE — PAD CHUX UNDERPAD 23X24" 7136

## (undated) RX ORDER — FENTANYL CITRATE 50 UG/ML
INJECTION, SOLUTION INTRAMUSCULAR; INTRAVENOUS
Status: DISPENSED
Start: 2019-02-15

## (undated) RX ORDER — SIMETHICONE 40MG/0.6ML
SUSPENSION, DROPS(FINAL DOSAGE FORM)(ML) ORAL
Status: DISPENSED
Start: 2018-01-17

## (undated) RX ORDER — FENTANYL CITRATE 50 UG/ML
INJECTION, SOLUTION INTRAMUSCULAR; INTRAVENOUS
Status: DISPENSED
Start: 2018-01-17

## (undated) RX ORDER — SIMETHICONE 40MG/0.6ML
SUSPENSION, DROPS(FINAL DOSAGE FORM)(ML) ORAL
Status: DISPENSED
Start: 2019-02-15

## (undated) RX ORDER — FENTANYL CITRATE 50 UG/ML
INJECTION, SOLUTION INTRAMUSCULAR; INTRAVENOUS
Status: DISPENSED
Start: 2023-05-02